# Patient Record
Sex: MALE | Race: WHITE
[De-identification: names, ages, dates, MRNs, and addresses within clinical notes are randomized per-mention and may not be internally consistent; named-entity substitution may affect disease eponyms.]

---

## 2017-12-07 ENCOUNTER — HOSPITAL ENCOUNTER (OUTPATIENT)
Dept: HOSPITAL 8 - CFH | Age: 42
Discharge: HOME | End: 2017-12-07
Attending: FAMILY MEDICINE
Payer: MEDICAID

## 2017-12-07 DIAGNOSIS — M50.323: Primary | ICD-10-CM

## 2017-12-07 DIAGNOSIS — M50.223: ICD-10-CM

## 2017-12-07 DIAGNOSIS — M62.81: ICD-10-CM

## 2017-12-07 PROCEDURE — 72141 MRI NECK SPINE W/O DYE: CPT

## 2018-04-02 ENCOUNTER — HOSPITAL ENCOUNTER (OUTPATIENT)
Dept: HOSPITAL 8 - CFH | Age: 43
Discharge: HOME | End: 2018-04-02
Attending: FAMILY MEDICINE
Payer: MEDICAID

## 2018-04-02 DIAGNOSIS — M62.81: Primary | ICD-10-CM

## 2018-04-02 PROCEDURE — A9585 GADOBUTROL INJECTION: HCPCS

## 2018-04-02 PROCEDURE — 70553 MRI BRAIN STEM W/O & W/DYE: CPT

## 2020-07-08 ENCOUNTER — HOSPITAL ENCOUNTER (EMERGENCY)
Dept: HOSPITAL 8 - ED | Age: 45
Discharge: HOME | End: 2020-07-08
Payer: SELF-PAY

## 2020-07-08 VITALS — DIASTOLIC BLOOD PRESSURE: 87 MMHG | SYSTOLIC BLOOD PRESSURE: 142 MMHG

## 2020-07-08 VITALS — HEIGHT: 73 IN | BODY MASS INDEX: 36.55 KG/M2 | WEIGHT: 275.8 LBS

## 2020-07-08 DIAGNOSIS — L03.115: Primary | ICD-10-CM

## 2020-07-08 DIAGNOSIS — Z87.891: ICD-10-CM

## 2020-07-08 LAB
ALBUMIN SERPL-MCNC: 3.7 G/DL (ref 3.4–5)
ANION GAP SERPL CALC-SCNC: 5 MMOL/L (ref 5–15)
BASOPHILS # BLD AUTO: 0.02 X10^3/UL (ref 0–0.1)
BASOPHILS NFR BLD AUTO: 0 % (ref 0–1)
CALCIUM SERPL-MCNC: 9.2 MG/DL (ref 8.5–10.1)
CHLORIDE SERPL-SCNC: 107 MMOL/L (ref 98–107)
CREAT SERPL-MCNC: 1 MG/DL (ref 0.7–1.3)
EOSINOPHIL # BLD AUTO: 0.06 X10^3/UL (ref 0–0.4)
EOSINOPHIL NFR BLD AUTO: 1 % (ref 1–7)
ERYTHROCYTE [DISTWIDTH] IN BLOOD BY AUTOMATED COUNT: 13.3 % (ref 9.4–14.8)
LYMPHOCYTES # BLD AUTO: 1.3 X10^3/UL (ref 1–3.4)
LYMPHOCYTES NFR BLD AUTO: 18 % (ref 22–44)
MCH RBC QN AUTO: 30.4 PG (ref 27.5–34.5)
MCHC RBC AUTO-ENTMCNC: 33 G/DL (ref 33.2–36.2)
MCV RBC AUTO: 92.1 FL (ref 81–97)
MD: NO
MONOCYTES # BLD AUTO: 0.7 X10^3/UL (ref 0.2–0.8)
MONOCYTES NFR BLD AUTO: 10 % (ref 2–9)
NEUTROPHILS # BLD AUTO: 5.11 X10^3/UL (ref 1.8–6.8)
NEUTROPHILS NFR BLD AUTO: 71 % (ref 42–75)
PLATELET # BLD AUTO: 195 X10^3/UL (ref 130–400)
PMV BLD AUTO: 8.8 FL (ref 7.4–10.4)
RBC # BLD AUTO: 5.1 X10^6/UL (ref 4.38–5.82)

## 2020-07-08 PROCEDURE — 82040 ASSAY OF SERUM ALBUMIN: CPT

## 2020-07-08 PROCEDURE — 80048 BASIC METABOLIC PNL TOTAL CA: CPT

## 2020-07-08 PROCEDURE — 85025 COMPLETE CBC W/AUTO DIFF WBC: CPT

## 2020-07-08 PROCEDURE — 99283 EMERGENCY DEPT VISIT LOW MDM: CPT

## 2020-07-08 PROCEDURE — 36415 COLL VENOUS BLD VENIPUNCTURE: CPT

## 2021-09-16 ENCOUNTER — APPOINTMENT (OUTPATIENT)
Dept: RADIOLOGY | Facility: MEDICAL CENTER | Age: 46
End: 2021-09-16
Attending: EMERGENCY MEDICINE
Payer: COMMERCIAL

## 2021-09-16 ENCOUNTER — HOSPITAL ENCOUNTER (EMERGENCY)
Facility: MEDICAL CENTER | Age: 46
End: 2021-09-16
Attending: EMERGENCY MEDICINE
Payer: COMMERCIAL

## 2021-09-16 VITALS
HEART RATE: 101 BPM | DIASTOLIC BLOOD PRESSURE: 58 MMHG | HEIGHT: 73 IN | RESPIRATION RATE: 16 BRPM | TEMPERATURE: 98.8 F | BODY MASS INDEX: 37.08 KG/M2 | WEIGHT: 279.76 LBS | OXYGEN SATURATION: 97 % | SYSTOLIC BLOOD PRESSURE: 109 MMHG

## 2021-09-16 DIAGNOSIS — L03.115 CELLULITIS OF RIGHT LOWER EXTREMITY: ICD-10-CM

## 2021-09-16 DIAGNOSIS — R21 RASH: ICD-10-CM

## 2021-09-16 LAB
ANION GAP SERPL CALC-SCNC: 11 MMOL/L (ref 7–16)
BASOPHILS # BLD AUTO: 0.3 % (ref 0–1.8)
BASOPHILS # BLD: 0.03 K/UL (ref 0–0.12)
BUN SERPL-MCNC: 19 MG/DL (ref 8–22)
CALCIUM SERPL-MCNC: 8.9 MG/DL (ref 8.5–10.5)
CHLORIDE SERPL-SCNC: 98 MMOL/L (ref 96–112)
CO2 SERPL-SCNC: 28 MMOL/L (ref 20–33)
CREAT SERPL-MCNC: 0.96 MG/DL (ref 0.5–1.4)
EOSINOPHIL # BLD AUTO: 0.1 K/UL (ref 0–0.51)
EOSINOPHIL NFR BLD: 0.9 % (ref 0–6.9)
ERYTHROCYTE [DISTWIDTH] IN BLOOD BY AUTOMATED COUNT: 45.3 FL (ref 35.9–50)
GLUCOSE SERPL-MCNC: 88 MG/DL (ref 65–99)
HCT VFR BLD AUTO: 43.9 % (ref 42–52)
HGB BLD-MCNC: 14.9 G/DL (ref 14–18)
IMM GRANULOCYTES # BLD AUTO: 0.06 K/UL (ref 0–0.11)
IMM GRANULOCYTES NFR BLD AUTO: 0.5 % (ref 0–0.9)
LYMPHOCYTES # BLD AUTO: 1.6 K/UL (ref 1–4.8)
LYMPHOCYTES NFR BLD: 14.2 % (ref 22–41)
MCH RBC QN AUTO: 31.2 PG (ref 27–33)
MCHC RBC AUTO-ENTMCNC: 33.9 G/DL (ref 33.7–35.3)
MCV RBC AUTO: 92 FL (ref 81.4–97.8)
MONOCYTES # BLD AUTO: 1.01 K/UL (ref 0–0.85)
MONOCYTES NFR BLD AUTO: 9 % (ref 0–13.4)
NEUTROPHILS # BLD AUTO: 8.46 K/UL (ref 1.82–7.42)
NEUTROPHILS NFR BLD: 75.1 % (ref 44–72)
NRBC # BLD AUTO: 0 K/UL
NRBC BLD-RTO: 0 /100 WBC
PLATELET # BLD AUTO: 207 K/UL (ref 164–446)
PMV BLD AUTO: 9.8 FL (ref 9–12.9)
POTASSIUM SERPL-SCNC: 4.6 MMOL/L (ref 3.6–5.5)
RBC # BLD AUTO: 4.77 M/UL (ref 4.7–6.1)
SODIUM SERPL-SCNC: 137 MMOL/L (ref 135–145)
WBC # BLD AUTO: 11.3 K/UL (ref 4.8–10.8)

## 2021-09-16 PROCEDURE — 85025 COMPLETE CBC W/AUTO DIFF WBC: CPT

## 2021-09-16 PROCEDURE — 93971 EXTREMITY STUDY: CPT | Mod: RT

## 2021-09-16 PROCEDURE — 700105 HCHG RX REV CODE 258: Performed by: EMERGENCY MEDICINE

## 2021-09-16 PROCEDURE — 96365 THER/PROPH/DIAG IV INF INIT: CPT

## 2021-09-16 PROCEDURE — 700111 HCHG RX REV CODE 636 W/ 250 OVERRIDE (IP): Performed by: EMERGENCY MEDICINE

## 2021-09-16 PROCEDURE — 80048 BASIC METABOLIC PNL TOTAL CA: CPT

## 2021-09-16 PROCEDURE — 99284 EMERGENCY DEPT VISIT MOD MDM: CPT

## 2021-09-16 RX ORDER — SULFAMETHOXAZOLE AND TRIMETHOPRIM 800; 160 MG/1; MG/1
1 TABLET ORAL 2 TIMES DAILY
Qty: 14 TABLET | Refills: 1 | Status: SHIPPED | OUTPATIENT
Start: 2021-09-16 | End: 2021-11-26

## 2021-09-16 RX ORDER — CEFAZOLIN SODIUM 2 G/100ML
2 INJECTION, SOLUTION INTRAVENOUS ONCE
Status: COMPLETED | OUTPATIENT
Start: 2021-09-16 | End: 2021-09-16

## 2021-09-16 RX ORDER — CEPHALEXIN 500 MG/1
500 TABLET ORAL 4 TIMES DAILY
Qty: 28 TABLET | Refills: 1 | Status: SHIPPED | OUTPATIENT
Start: 2021-09-16 | End: 2021-11-26

## 2021-09-16 RX ORDER — CEPHALEXIN 500 MG/1
500 TABLET ORAL 4 TIMES DAILY
Qty: 28 TABLET | Refills: 0 | Status: SHIPPED | OUTPATIENT
Start: 2021-09-16 | End: 2021-09-16 | Stop reason: SDUPTHER

## 2021-09-16 RX ORDER — SODIUM CHLORIDE 9 MG/ML
1000 INJECTION, SOLUTION INTRAVENOUS ONCE
Status: COMPLETED | OUTPATIENT
Start: 2021-09-16 | End: 2021-09-16

## 2021-09-16 RX ORDER — TRIAMCINOLONE ACETONIDE 1 MG/G
OINTMENT TOPICAL
Qty: 30 G | Refills: 0 | Status: SHIPPED | OUTPATIENT
Start: 2021-09-16 | End: 2021-11-26

## 2021-09-16 RX ADMIN — SODIUM CHLORIDE 1000 ML: 9 INJECTION, SOLUTION INTRAVENOUS at 18:29

## 2021-09-16 RX ADMIN — CEFAZOLIN SODIUM 2 G: 2 INJECTION, SOLUTION INTRAVENOUS at 18:29

## 2021-09-17 NOTE — ED NOTES
Patient discharged in stable condition per orders. IV access removed - bandage applied. Patient verbalized understanding of all discharge instructions. All belongings accounted for. Pt ambulatory to lobby with steady gait.

## 2021-09-17 NOTE — ED TRIAGE NOTES
"Chief Complaint   Patient presents with   • Rash     46 yo male ambulatory to triage for above complaint. Pt reports redness and swelling to R shin x 2 wks, hx of cellulitis to area. Also reports large red rash to abdomen x 3D with no response to anti-itch creams. AOx4, GCS 15.    Educated on triage process, encourage to inform staff of any changes.     /94   Pulse (!) 120   Temp 37.5 °C (99.5 °F) (Temporal)   Resp 16   Ht 1.854 m (6' 1\")   Wt (!) 127 kg (279 lb 12.2 oz)   SpO2 95%   BMI 36.91 kg/m²   "

## 2021-09-17 NOTE — DISCHARGE INSTRUCTIONS
Cellulitis    Start antibiotics cephalexin tomorrow morning.  Elevate the affected area above the heart if possible.  Return for redness spreading more than 1-2 inches, red streaks, ill appearance or formation of abscess.  Keep any open areas bandaged to prevent contagion.  See a doctor or return if not better 2-3 days.  Use triamcinolone cream on the skin of the chest for rash.  Start bactrim if not improving 3 days.  Take both for a second week if not completely resolved.    You had an elevated or high normal blood pressure reading today.  This does not necessarily mean you have hypertension.  Please followup with your/a primary physician for comprehensive blood pressure evaluation.  BP Readings from Last 3 Encounters:   09/16/21 160/94   .      Your caregiver has diagnosed you or your child as having cellulitis. Cellulitis is an infection of the skin and the tissue beneath it. The area is typically red and tender. It is caused by bacteria (germs) (usually staph or strep) that enter the body through cuts or sores. Cellulitis most commonly occurs in the arms and/or lower legs.      HOME CARE INSTRUCTIONS  If you are given a prescription for antibiotics (medications which kill germs), take as directed until finished.  If the infection is on the arm or leg, keep the limb elevated as able.  Use a heating pad several times per day to relieve pain and encourage healing. Do not sleep with heating pad. If you are diabetic, do not use a heating pad unless instructed to do so.  See your caregiver for a recheck of the infected site in 2 days, or sooner if problems arise.  Use acetaminophen (Tylenol®) or ibuprofen (Advil® or Motrin®) as needed for relief of fever, pain and discomfort.    seek medical care if:  An oral temperature above 102° F (38.9° C) develops, or as your caregiver suggests, not controlled by medication.  The area of redness is spreading, there are red streaks coming from the infected site, or if a part of  the infection begins to turn dark in color.  The joint or bone underneath the infected skin becomes painful after the skin has healed.  The infection returns in the same or another area after it seems to have gone away.  A boil or bump swells up. This may be an abscess.  New, unexplained symptoms (problems) such as pain or fever develop.    seek immediate medical care if:   You or your child feel drowsy or lethargic.  There is vomiting, diarrhea, or generalized malaise with muscle aches and pains.    Document Released: 09/27/2006  Document Re-Released: 01/29/2008  [facility] [facility]® Patient Information ©2008 [employer].

## 2021-09-17 NOTE — ED PROVIDER NOTES
ED Provider Note    Scribed for Mark Lawson M.D. by Zafar Gatica. 9/16/2021  5:41 PM    Primary care provider: None reported.   Means of arrival: Walk-in  History obtained from: Patient  History limited by: None     CHIEF COMPLAINT  Chief Complaint   Patient presents with   • Rash       HPI  Robby Fox is a 45 y.o. male who presents to the Emergency Department complaining of redness and swelling to the right leg onset 1.5-2 weeks ago.He states that when it first began it felt like he was walking on bone, and the pain has remained constant over the past two weeks. He has associated symptoms of pain in the right leg and a rash on his stomach that began three days ago. He states that the rash on his stomach itches when he touches it. He denies any fever, nausea, vomiting, body aches, flu like symptoms, chest pain, shortness of breath, coughing up blood, cough, headache, abdominal pain, changes in urination, or changes in bowel movement.  He had an infection in his right leg in September, 2020 and was prescribed bactrim and keflex. He denies any history of diabetes, deep vein thrombosis, or pulmonary embolism. He has a history of asthma and sleep apnea. He has not received a COVID-19 vaccine.     REVIEW OF SYSTEMS  Pertinent positives include: redness and swelling to the left leg, pain in the left leg, and a rash on his stomach.  Pertinent negatives include: fever, nausea, vomiting, body aches, flu like symptoms, chest pain, shortness of breath, coughing up blood, cough, headache, abdominal pain, changes in urination, or changes in bowel movement.  10+ systems reviewed and negative.      PAST MEDICAL HISTORY  Cellulitis, denies diabetes or immune compromise    SOCIAL HISTORY  Social History     Tobacco Use   • Smoking status: Never Smoker   • Smokeless tobacco: Never Used   Substance Use Topics   • Alcohol use: Yes     Comment: occ   • Drug use: Never     Social History     Substance and Sexual Activity  "  Drug Use Never       CURRENT MEDICATIONS  None.    ALLERGIES  None known    PHYSICAL EXAM  VITAL SIGNS: /94   Pulse (!) 120   Temp 37.5 °C (99.5 °F) (Temporal)   Resp 16   Ht 1.854 m (6' 1\")   Wt (!) 127 kg (279 lb 12.2 oz)   SpO2 95%   BMI 36.91 kg/m²   Reviewed and tachycardic, hypertensive, afebrile  Constitutional: Well developed, Well nourished, Elevated BMI.  HENT: Normocephalic, atraumatic, bilateral external ears normal, wearing a mask.   Eyes: PERRLA, conjunctiva pink, no scleral icterus.   Cardiovascular: Tachycardic. Regular rhythm. No murmurs, rubs or gallops.  No dependent edema or calf tenderness  Respiratory: Lungs clear to auscultation bilaterally. No wheezes, rales, or rhonchi.  Abdominal:  Abdomen soft, non-tender, non distended. No rebound, or guarding.    Skin: 2+ pitting edema, erythema, and scales without calf edema or tenderness. No lymphangitic streaking. Macular rash disconnected and symetric on torso.   Genitourinary: No costovertebral angle tenderness.   Musculoskeletal: no deformities.   Neurologic: Alert & oriented x 3, cranial nerves 2-12 intact by passive exam.  No focal deficit noted.  Psychiatric: Affect normal, Judgment normal, Mood normal.         DIFFERENTIAL DIAGNOSIS:  Cellulitis, DVT, or Allergic rash.      RADIOLOGY/PROCEDURES  US-EXTREMITY VENOUS LOWER UNILAT RIGHT   Final Result      NEG for DVT  Radiologist interpretation have been reviewed by me.     LABORATORY:  Results for orders placed or performed during the hospital encounter of 09/16/21   CBC WITH DIFFERENTIAL   Result Value Ref Range    WBC 11.3 (H) 4.8 - 10.8 K/uL    RBC 4.77 4.70 - 6.10 M/uL    Hemoglobin 14.9 14.0 - 18.0 g/dL    Hematocrit 43.9 42.0 - 52.0 %    MCV 92.0 81.4 - 97.8 fL    MCH 31.2 27.0 - 33.0 pg    MCHC 33.9 33.7 - 35.3 g/dL    RDW 45.3 35.9 - 50.0 fL    Platelet Count 207 164 - 446 K/uL    MPV 9.8 9.0 - 12.9 fL    Neutrophils-Polys 75.10 (H) 44.00 - 72.00 %    Lymphocytes 14.20 (L) " "22.00 - 41.00 %    Monocytes 9.00 0.00 - 13.40 %    Eosinophils 0.90 0.00 - 6.90 %    Basophils 0.30 0.00 - 1.80 %    Immature Granulocytes 0.50 0.00 - 0.90 %    Nucleated RBC 0.00 /100 WBC    Neutrophils (Absolute) 8.46 (H) 1.82 - 7.42 K/uL    Lymphs (Absolute) 1.60 1.00 - 4.80 K/uL    Monos (Absolute) 1.01 (H) 0.00 - 0.85 K/uL    Eos (Absolute) 0.10 0.00 - 0.51 K/uL    Baso (Absolute) 0.03 0.00 - 0.12 K/uL    Immature Granulocytes (abs) 0.06 0.00 - 0.11 K/uL    NRBC (Absolute) 0.00 K/uL   Basic Metabolic Panel   Result Value Ref Range    Sodium 137 135 - 145 mmol/L    Potassium 4.6 3.6 - 5.5 mmol/L    Chloride 98 96 - 112 mmol/L    Co2 28 20 - 33 mmol/L    Glucose 88 65 - 99 mg/dL    Bun 19 8 - 22 mg/dL    Creatinine 0.96 0.50 - 1.40 mg/dL    Calcium 8.9 8.5 - 10.5 mg/dL    Anion Gap 11.0 7.0 - 16.0       Lab results reviewed by me.     INTERVENTIONS:  Medications   ceFAZolin in dextrose (ANCEF) IVPB premix 2 g (2 g Intravenous New Bag 9/16/21 1829)   NS infusion 1,000 mL (1,000 mL Intravenous New Bag 9/16/21 1829)       ED COURSE:  Nursing notes, VS, PMSFHx reviewed in chart.     5:41 PM - Patient seen and examined at bedside. Patient will be treated with ANCEF IVPB premix 2 g and NS infusion 1,000 mL for his symptoms. Ordered US-Extremity Venous Lower Unilateral Right, CBC with differential, and BMP to evaluate.     MEDICAL DECISION MAKING:  Well-appearing patient presents with right leg redness and swelling and appears to have a cellulitis.  There is no DVT.  There is no evidence of abscess or necrotizing fasciitis.  He does not have sepsis.  He also has a rash on his chest and abdomen that is mostly symmetric and is likely allergic.    Vital signs improved below after IV fluids  /58   Pulse (!) 101   Temp 37.1 °C (98.8 °F) (Temporal)   Resp 16   Ht 1.854 m (6' 1\")   Wt (!) 127 kg (279 lb 12.2 oz)   SpO2 97%   BMI 36.91 kg/m²     PLAN:  Discharge Medication List as of 9/16/2021  8:02 PM      START " taking these medications    Details   triamcinolone acetonide (KENALOG) 0.1 % Ointment Apply small amount to rash TID, sparing face and genitals., Disp-30 g, R-0, Print Rx Paper      sulfamethoxazole-trimethoprim (BACTRIM DS) 800-160 MG tablet Take 1 Tablet by mouth 2 times a day., Disp-14 Tablet, R-1, Print Rx Paper         Ancef  Ibuprofen, Tylenol, leg elevation  Cellulitis handout given  Return for spreading more than 2 inches, persistent dizziness, uncontrolled vomiting, ill appearance    [facility], Emergency Dept  [address] [address] [phone]    If not improving in 2 to 3 days or for redness spreading more than 2 inches, uncontrolled vomiting, dizziness, ill appearance.      CONDITION: Stable.     FINAL IMPRESSION  1. Cellulitis of right lower extremity    2. Rash          IaZfar (Scribe), am scribing for, and in the presence of, Mark Lawson M.D..    Electronically signed by: Zafar Gatica (Scribe), 9/16/2021    IMark M.D. personally performed the services described in this documentation, as scribed by Zafar Gatica in my presence, and it is both accurate and complete. C.     The note accurately reflects work and decisions made by me.  Mark Lawson M.D.  9/17/2021  2:07 PM

## 2021-11-26 ENCOUNTER — HOSPITAL ENCOUNTER (EMERGENCY)
Facility: MEDICAL CENTER | Age: 46
End: 2021-11-26
Attending: EMERGENCY MEDICINE
Payer: COMMERCIAL

## 2021-11-26 VITALS
DIASTOLIC BLOOD PRESSURE: 74 MMHG | BODY MASS INDEX: 35.24 KG/M2 | TEMPERATURE: 97.5 F | HEIGHT: 73 IN | HEART RATE: 95 BPM | WEIGHT: 265.88 LBS | RESPIRATION RATE: 18 BRPM | SYSTOLIC BLOOD PRESSURE: 121 MMHG | OXYGEN SATURATION: 97 %

## 2021-11-26 DIAGNOSIS — L03.119 CELLULITIS OF LOWER EXTREMITY, UNSPECIFIED LATERALITY: ICD-10-CM

## 2021-11-26 LAB
ANION GAP SERPL CALC-SCNC: 15 MMOL/L (ref 7–16)
BASOPHILS # BLD AUTO: 0.4 % (ref 0–1.8)
BASOPHILS # BLD: 0.05 K/UL (ref 0–0.12)
BUN SERPL-MCNC: 12 MG/DL (ref 8–22)
CALCIUM SERPL-MCNC: 9.8 MG/DL (ref 8.4–10.2)
CHLORIDE SERPL-SCNC: 98 MMOL/L (ref 96–112)
CO2 SERPL-SCNC: 25 MMOL/L (ref 20–33)
CREAT SERPL-MCNC: 0.92 MG/DL (ref 0.5–1.4)
EOSINOPHIL # BLD AUTO: 0.04 K/UL (ref 0–0.51)
EOSINOPHIL NFR BLD: 0.4 % (ref 0–6.9)
ERYTHROCYTE [DISTWIDTH] IN BLOOD BY AUTOMATED COUNT: 41.8 FL (ref 35.9–50)
EST. AVERAGE GLUCOSE BLD GHB EST-MCNC: 114 MG/DL
GLUCOSE SERPL-MCNC: 106 MG/DL (ref 65–99)
GRAM STN SPEC: NORMAL
HBA1C MFR BLD: 5.6 % (ref 4–5.6)
HCT VFR BLD AUTO: 49.4 % (ref 42–52)
HGB BLD-MCNC: 16.6 G/DL (ref 14–18)
IMM GRANULOCYTES # BLD AUTO: 0.09 K/UL (ref 0–0.11)
IMM GRANULOCYTES NFR BLD AUTO: 0.8 % (ref 0–0.9)
LYMPHOCYTES # BLD AUTO: 1.75 K/UL (ref 1–4.8)
LYMPHOCYTES NFR BLD: 15.7 % (ref 22–41)
MCH RBC QN AUTO: 30.6 PG (ref 27–33)
MCHC RBC AUTO-ENTMCNC: 33.6 G/DL (ref 33.7–35.3)
MCV RBC AUTO: 91 FL (ref 81.4–97.8)
MONOCYTES # BLD AUTO: 0.8 K/UL (ref 0–0.85)
MONOCYTES NFR BLD AUTO: 7.2 % (ref 0–13.4)
NEUTROPHILS # BLD AUTO: 8.45 K/UL (ref 1.82–7.42)
NEUTROPHILS NFR BLD: 75.5 % (ref 44–72)
NRBC # BLD AUTO: 0 K/UL
NRBC BLD-RTO: 0 /100 WBC
PLATELET # BLD AUTO: 280 K/UL (ref 164–446)
PMV BLD AUTO: 9.8 FL (ref 9–12.9)
POTASSIUM SERPL-SCNC: 4.2 MMOL/L (ref 3.6–5.5)
RBC # BLD AUTO: 5.43 M/UL (ref 4.7–6.1)
SIGNIFICANT IND 70042: NORMAL
SITE SITE: NORMAL
SODIUM SERPL-SCNC: 138 MMOL/L (ref 135–145)
SOURCE SOURCE: NORMAL
WBC # BLD AUTO: 11.2 K/UL (ref 4.8–10.8)

## 2021-11-26 PROCEDURE — 87186 SC STD MICRODIL/AGAR DIL: CPT

## 2021-11-26 PROCEDURE — 85025 COMPLETE CBC W/AUTO DIFF WBC: CPT

## 2021-11-26 PROCEDURE — 83036 HEMOGLOBIN GLYCOSYLATED A1C: CPT

## 2021-11-26 PROCEDURE — 36415 COLL VENOUS BLD VENIPUNCTURE: CPT

## 2021-11-26 PROCEDURE — 80048 BASIC METABOLIC PNL TOTAL CA: CPT

## 2021-11-26 PROCEDURE — 87077 CULTURE AEROBIC IDENTIFY: CPT

## 2021-11-26 PROCEDURE — 87070 CULTURE OTHR SPECIMN AEROBIC: CPT

## 2021-11-26 PROCEDURE — A6403 STERILE GAUZE>16 <= 48 SQ IN: HCPCS

## 2021-11-26 PROCEDURE — 87205 SMEAR GRAM STAIN: CPT

## 2021-11-26 PROCEDURE — 99283 EMERGENCY DEPT VISIT LOW MDM: CPT

## 2021-11-26 RX ORDER — CHLORHEXIDINE GLUCONATE 213 G/1000ML
SOLUTION TOPICAL
Qty: 240 ML | Refills: 2 | Status: SHIPPED | OUTPATIENT
Start: 2021-11-26 | End: 2022-09-10

## 2021-11-26 RX ORDER — BUPROPION HYDROCHLORIDE 300 MG/1
300 TABLET ORAL EVERY MORNING
COMMUNITY

## 2021-11-26 RX ORDER — LORATADINE 10 MG/1
10 TABLET ORAL EVERY MORNING
COMMUNITY

## 2021-11-26 RX ORDER — SULFAMETHOXAZOLE AND TRIMETHOPRIM 800; 160 MG/1; MG/1
1 TABLET ORAL 2 TIMES DAILY
Qty: 24 TABLET | Refills: 0 | Status: SHIPPED | OUTPATIENT
Start: 2021-11-26 | End: 2021-12-08

## 2021-11-26 RX ORDER — NAPROXEN 500 MG/1
1000 TABLET ORAL 4 TIMES DAILY PRN
COMMUNITY

## 2021-11-26 RX ORDER — CEPHALEXIN 500 MG/1
500 CAPSULE ORAL 4 TIMES DAILY
Qty: 48 CAPSULE | Refills: 0 | Status: SHIPPED | OUTPATIENT
Start: 2021-11-26 | End: 2021-12-08

## 2021-11-26 RX ORDER — ASCORBIC ACID 500 MG
500 TABLET ORAL EVERY MORNING
Status: SHIPPED | COMMUNITY
End: 2022-09-10

## 2021-11-26 NOTE — ED NOTES
Med rec completed per Pt at bedside.  Allergies reviewed with Pt. No known drug allergies.  Pt reports taking naproxen 500 mg, 2 tablets up to 4 times per day (4,000 mg per 24 hours) as needed for pain. Pharmacist notified.  No oral antibiotics in last 30 days.  Pt's preferred pharmacy: [facility] on [address]

## 2021-11-26 NOTE — ED NOTES
Iv placed , labs bld cx x 1  drawn and taken to lab. poc update given to pt, results pending at this time  Wound cx from left ankle done

## 2021-11-26 NOTE — ED TRIAGE NOTES
Pt presents with sister from home for recurrent skin infection on right lower leg. This one started last Saturday. +chills. A&Ox4 and ambulatory.     Has this patient been vaccinated for COVID no  If not, would they like to be vaccinated while in the ER if eligible?  no  Would the patient like to speak with the ERP about the possibility of receiving the COVID vaccine today before making a decision? no

## 2021-11-26 NOTE — ED PROVIDER NOTES
ED Provider Note    CHIEF COMPLAINT  Leg infection    HPI  Robby Fox is a 46 y.o. male who presents to contact him here with leg infection.  He describes it as sores that are draining.  Right leg is just near the knee left leg which is healing and also is associated with fevers and chills on Saturday.  No alleviating factors.  No exacerbating factors and associated with infection on his toes which started in his left toes and then his right    Very pleasant 46-year-old male he has no underlying medical conditions such as diabetes or immune issues he does have a shared common area where he has a room but he has shared common areas including bathroom with other people.  And is here now with infection    It started with his left foot about 2 weeks ago on his toes became crusty with yellowish discharge.  Then, he noticed on his right foot and also both of his legs.  He believes it is most likely spread while he was sleeping.  I will review his chart he been treated for cellulitis in September.  And also last year.    REVIEW OF SYSTEMS  General: No fever chills  Dermatologic: See above      PAST MEDICAL HISTORY  Past Medical History:   Diagnosis Date   • Asthma        FAMILY HISTORY  History reviewed. No pertinent family history.    SOCIAL HISTORY  Social History     Socioeconomic History   • Marital status: Single     Spouse name: Not on file   • Number of children: Not on file   • Years of education: Not on file   • Highest education level: Not on file   Occupational History   • Not on file   Tobacco Use   • Smoking status: Former Smoker   • Smokeless tobacco: Never Used   Vaping Use   • Vaping Use: Never used   Substance and Sexual Activity   • Alcohol use: Yes     Comment: occ   • Drug use: Never   • Sexual activity: Not on file   Other Topics Concern   • Not on file   Social History Narrative   • Not on file     Social Determinants of Health     Financial Resource Strain:    • Difficulty of Paying Living  "Expenses: Not on file   Food Insecurity:    • Worried About Running Out of Food in the Last Year: Not on file   • Ran Out of Food in the Last Year: Not on file   Transportation Needs:    • Lack of Transportation (Medical): Not on file   • Lack of Transportation (Non-Medical): Not on file   Physical Activity:    • Days of Exercise per Week: Not on file   • Minutes of Exercise per Session: Not on file   Stress:    • Feeling of Stress : Not on file   Social Connections:    • Frequency of Communication with Friends and Family: Not on file   • Frequency of Social Gatherings with Friends and Family: Not on file   • Attends Restoration Services: Not on file   • Active Member of Clubs or Organizations: Not on file   • Attends Club or Organization Meetings: Not on file   • Marital Status: Not on file   Intimate Partner Violence:    • Fear of Current or Ex-Partner: Not on file   • Emotionally Abused: Not on file   • Physically Abused: Not on file   • Sexually Abused: Not on file   Housing Stability:    • Unable to Pay for Housing in the Last Year: Not on file   • Number of Places Lived in the Last Year: Not on file   • Unstable Housing in the Last Year: Not on file       SURGICAL HISTORY  Past Surgical History:   Procedure Laterality Date   • INGUINAL HERNIA REPAIR     • OTHER ORTHOPEDIC SURGERY      cyst removal around neck       CURRENT MEDICATIONS  Home Medications     Reviewed by Pushpa Trejo R.N. (Registered Nurse) on 11/26/21 at 1106  Med List Status: Not Addressed   Medication Last Dose Status   Cephalexin 500 MG Tab  Active   sulfamethoxazole-trimethoprim (BACTRIM DS) 800-160 MG tablet  Active   triamcinolone acetonide (KENALOG) 0.1 % Ointment  Active                 ALLERGIES  No Known Allergies    PHYSICAL EXAM  VITAL SIGNS: /84   Pulse (!) 136   Temp 36.9 °C (98.5 °F) (Temporal)   Resp 18   Ht 1.854 m (6' 1\")   Wt 121 kg (265 lb 14 oz)   SpO2 96%   BMI 35.08 kg/m²   Constitutional: Well developed, " Well nourished, No acute distress, Non-toxic appearance.   HENT: Normocephalic, Atraumatic, Bilateral external ears normal, Oropharynx moist, No oral exudates, Nose normal.   Eyes: PERRLA, EOMI, Conjunctiva normal, No discharge.   Musculoskeletal: Neck has normal range of motion, No tenderness, Supple.   Lymphatic: No cervical lymphadenopathy noted.   Cardiovascular: Normal heart rate, Normal rhythm, No murmurs, No rubs, No gallops.   Thorax & Lungs: Normal breath sounds, No respiratory distress, No wheezing, No chest tenderness.   Abdomen: Nondistended nontender soft  Skin: In his right medial portion between near his knee he has a quarter sized ulcerated area it is not significant deep it is red no surrounding erythema the left has 1 near his thigh which appears to be well-healing and scab 1 on his lateral side of his knee also slightly warm he has diffuse erythema both legs only still slightly ecchymotic over his right fourth third second and first toe is crusty yellowish discharge.  The toes are stuck together.  There is no streaking noted.  The right toes have some slight ulcerations noted.  : No CVA tenderness.   Psychiatric: Calm, not anxious  Neurologic: Alert & oriented, moves all extremities equally    RADIOLOGY/PROCEDURES  No orders to display     Results for orders placed or performed during the hospital encounter of 11/26/21   CBC WITH DIFFERENTIAL   Result Value Ref Range    WBC 11.2 (H) 4.8 - 10.8 K/uL    RBC 5.43 4.70 - 6.10 M/uL    Hemoglobin 16.6 14.0 - 18.0 g/dL    Hematocrit 49.4 42.0 - 52.0 %    MCV 91.0 81.4 - 97.8 fL    MCH 30.6 27.0 - 33.0 pg    MCHC 33.6 (L) 33.7 - 35.3 g/dL    RDW 41.8 35.9 - 50.0 fL    Platelet Count 280 164 - 446 K/uL    MPV 9.8 9.0 - 12.9 fL    Neutrophils-Polys 75.50 (H) 44.00 - 72.00 %    Lymphocytes 15.70 (L) 22.00 - 41.00 %    Monocytes 7.20 0.00 - 13.40 %    Eosinophils 0.40 0.00 - 6.90 %    Basophils 0.40 0.00 - 1.80 %    Immature Granulocytes 0.80 0.00 - 0.90 %     Nucleated RBC 0.00 /100 WBC    Neutrophils (Absolute) 8.45 (H) 1.82 - 7.42 K/uL    Lymphs (Absolute) 1.75 1.00 - 4.80 K/uL    Monos (Absolute) 0.80 0.00 - 0.85 K/uL    Eos (Absolute) 0.04 0.00 - 0.51 K/uL    Baso (Absolute) 0.05 0.00 - 0.12 K/uL    Immature Granulocytes (abs) 0.09 0.00 - 0.11 K/uL    NRBC (Absolute) 0.00 K/uL   BASIC METABOLIC PANEL   Result Value Ref Range    Sodium 138 135 - 145 mmol/L    Potassium 4.2 3.6 - 5.5 mmol/L    Chloride 98 96 - 112 mmol/L    Co2 25 20 - 33 mmol/L    Glucose 106 (H) 65 - 99 mg/dL    Bun 12 8 - 22 mg/dL    Creatinine 0.92 0.50 - 1.40 mg/dL    Calcium 9.8 8.4 - 10.2 mg/dL    Anion Gap 15.0 7.0 - 16.0   ESTIMATED GFR   Result Value Ref Range    GFR If African American >60 >60 mL/min/1.73 m 2    GFR If Non African American >60 >60 mL/min/1.73 m 2        COURSE & MEDICAL DECISION MAKING  Pertinent Labs & Imaging studies reviewed. (See chart for details)  46-year-old gentleman with cellulitis bilateral legs.  Cellulitis  Cause unknown we will check a 1 acc patient of diabetes he does have decreased hair over his legs is just most likely vascular he is living in a van for 5 years where he had cold temperatures stress or some chronic circulatory issues.  Does not appear septic we will give antibiotics just a CBC check a 1 AC.  See Ancef 2 g IV.  He has no significant pain while he is lying there only is putting his weight on his feet.    Chronic cellulitis  Patient years of chronic cellulitis with a new wound culture to see if it is MRSA he does live in a group home setting so he may need some chlorhexidine washes as well and better dermatology care.    He does not have a primary care doctor has been tween doctors.  Recommend that he follow-up and establish primary care doctor with [facility].  At this point the cultures and the A1c will not be present for anyone to follow-up    Extensive discussion and counseling regarding wound care proper barriers to treatment for skin why the  chlorhexidine and and Bactroban to rule out and treat for possible MRSA.    FINAL IMPRESSION  1.  Cellulitis with recurrent episodes  2.   3.      Electronically signed by: Esa Santamaria M.D., 11/26/2021 1:04 PM

## 2021-11-27 NOTE — ED NOTES
D/c pt home, 3 rx given directly to pharmacy  . Pt aware of f/u instructions , aware to return for any changes or concerns. No further questions upon d/c home from ed

## 2021-11-27 NOTE — DISCHARGE INSTRUCTIONS
Make sure you soak each foot for 20 minutes half peroxide half water  Make sure afterwards you dry your foot very well.  Put something between your toes to dry it out every day while you do this.  Do this for the next 10 days  Make sure you take antibiotics for the 12 days    At the end of December I want to do the complete body wash and 5 nights of the cream  Repeat this in the end of January as well    You can always use the ointment for any wound that starts right away.

## 2021-11-27 NOTE — ED NOTES
Adaptic dressing to wounds, MD spoke in length in regards to wound care .  Family at bedside, verbal understanding  Pt to be d/c home

## 2021-11-29 LAB
BACTERIA WND AEROBE CULT: ABNORMAL
GRAM STN SPEC: ABNORMAL
SIGNIFICANT IND 70042: ABNORMAL
SITE SITE: ABNORMAL
SOURCE SOURCE: ABNORMAL

## 2021-11-29 NOTE — ED NOTES
"ED Positive Culture Follow-up/Notification Note:    Date: 11/29/2021     Patient seen in the ED on 11/26/2021 for suspected infection of the leg.   1. Cellulitis of lower extremity, unspecified laterality       Discharge Medication List as of 11/26/2021  4:19 PM      START taking these medications    Details   cephALEXin (KEFLEX) 500 MG Cap Take 1 Capsule by mouth 4 times a day for 12 days., Disp-48 Capsule, R-0, Normal      sulfamethoxazole-trimethoprim (BACTRIM DS) 800-160 MG tablet Take 1 Tablet by mouth 2 times a day for 12 days., Disp-24 Tablet, R-0, Normal      mupirocin (BACTROBAN) 2 % Ointment Apply 1 Application topically every day. Apply nightly to inside of both noses for 5 nights.  Repeat in 1 month., Disp-10 g, R-0, Normal      chlorhexidine (HIBICLENS) 4 % liquid Is from head to feet once in late December and once in late January, Disp-240 mL, R-2, Normal             Allergies: Patient has no known allergies.     Vitals:    11/26/21 1059 11/26/21 1618   BP: 119/84 121/74   Pulse: (!) 136 95   Resp: 18 18   Temp: 36.9 °C (98.5 °F) 36.4 °C (97.5 °F)   TempSrc: Temporal    SpO2: 96% 97%   Weight: 121 kg (265 lb 14 oz)    Height: 1.854 m (6' 1\")        Final cultures:   Results     Procedure Component Value Units Date/Time    CULTURE WOUND W/ GRAM STAIN [[id number]]  (Abnormal)  (Susceptibility) Collected: 11/26/21 1318    Order Status: Completed Specimen: Wound from Left Foot Updated: 11/29/21 0816     Significant Indicator POS     Source WND     Site LEFT FOOT     Culture Result -     Gram Stain Result Many Gram positive cocci.  Rare Gram negative rods.       Culture Result Streptococcus pyogenes (Group A)  Heavy growth        Methicillin Resistant Staphylococcus aureus  Heavy growth        Morganella morganii  Moderate growth      Narrative:      CALL  [place]  [facility] tel. [phone],  CALLED  [facility] tel. [phone] 11/27/2021, 13:24, RB PERF. RESULTS CALLED TO:  [id number]    Susceptibility     Methicillin " resistant staphylococcus aureus (2)     Antibiotic Interpretation Microscan   Method Status    Ampicillin  [*]  Resistant >8 mcg/mL FLASH Final    Amoxicillin/CA  [*]  Resistant <=4/2 mcg/mL FLASH Final    Azithromycin Resistant >4 mcg/mL FLASH Final    Ceftriaxone  [*]  Resistant 16 mcg/mL FLASH Final    Clindamycin Sensitive <=0.25 mcg/mL FLASH Final    Cephalothin  [*]  Resistant <=8 mcg/mL FLASH Final    Cefoxitin Screen  [*]  Positive >4 mcg/mL FLASH Final    Cefazolin Resistant <=8 mcg/mL FLASH Final    Ciprofloxacin  [*]  Resistant >2 mcg/mL FLASH Final    Cefepime Resistant 16 mcg/mL FLASH Final    Ceftaroline Sensitive <=0.5 mcg/mL FLASH Final    Daptomycin Sensitive 1 mcg/mL FLASH Final    Erythromycin Resistant >4 mcg/mL FLASH Final    Gentamicin  [*]  Sensitive <=4 mcg/mL FLASH Final    Inducible Clindamycin Test  [*]  Negative <=4/0.5 mcg/mL FLASH Final    Imipenem  [*]  Resistant <=4 mcg/mL FLASH Final    Levofloxacin  [*]  Resistant >4 mcg/mL FLASH Final    Linezolid  [*]  Sensitive 2 mcg/mL FLASH Final    Ampicillin/sulbactam Resistant 16/8 mcg/mL FLASH Final    Meropenem  [*]  Resistant <=2 mcg/mL FLASH Final    Oxacillin Resistant >2 mcg/mL FLASH Final    Rifampin  [*]  Sensitive <=1 mcg/mL FLASH Final    Synercid  [*]  Sensitive <=0.5 mcg/mL FLASH Final    Trimeth/Sulfa Sensitive <=0.5/9.5 mcg/mL FLASH Final    Tetracycline Sensitive <=4 mcg/mL FLASH Final    Tigecycline  [*]  Sensitive <=0.25 mcg/mL FLASH Final    Vancomycin Sensitive 1 mcg/mL FLASH Final          Morganella morganii (3)     Antibiotic Interpretation Microscan   Method Status    Ampicillin Resistant >16 mcg/mL FLASH Final    Amoxicillin/CA  [*]  Resistant >16/8 mcg/mL FLASH Final    Ceftriaxone Sensitive <=1 mcg/mL FLASH Final    Cefazolin Resistant >16 mcg/mL FLASH Final    Ciprofloxacin Resistant 1 mcg/mL FLASH Final    Cefepime Sensitive 4 mcg/mL FLASH Final    Gentamicin Resistant >8 mcg/mL FLASH Final    Imipenem  [*]  Resistant 4 mcg/mL FLASH Final    Levofloxacin  [*]  Intermediate 1 mcg/mL  FLASH Final    Ampicillin/sulbactam Resistant >16/8 mcg/mL FLASH Final    Meropenem  [*]  Sensitive <=1 mcg/mL FLASH Final    Tobramycin Intermediate 8 mcg/mL FLASH Final    Trimeth/Sulfa Resistant >2/38 mcg/mL FLASH Final    Tetracycline  [*]  Resistant >8 mcg/mL FLASH Final    Tigecycline Resistant <=2 mcg/mL FLASH Final    Amikacin  [*]  Sensitive <=16 mcg/mL FLASH Final    Aztreonam  [*]  Resistant >16 mcg/mL FLASH Final    Ceftolozane+Tazobactam  [*]  Resistant >8 mcg/mL FLASH Final    Ceftazidime  [*]  Resistant >16 mcg/mL FLASH Final    Cefuroxime Resistant >16 mcg/mL FLASH Final    Ertapenem Sensitive <=0.5 mcg/mL FLASH Final    Nitrofurantoin  [*]  Resistant >64 mcg/mL FLASH Final    Meropenem/Vaborbactam  [*]  Sensitive <=2 mcg/mL FLASH Final    Minocycline Resistant >8 mcg/mL FLASH Final    Moxifloxacin Resistant >4 mcg/mL FLASH Final    Pip/Tazobactam Sensitive <=8 mcg/mL FLASH Final           [*]  Suppressed Antibiotic          Condensed View                   GRAM STAIN [[id number]] Collected: 11/26/21 1318    Order Status: Completed Specimen: Wound Updated: 11/26/21 1929     Significant Indicator .     Source WND     Site LEFT FOOT     Gram Stain Result Many Gram positive cocci.  Rare Gram negative rods.            Plan:   Morganella in wound culture may be AmpC  given resistance to aztreonam and ceftazidime. Unfortunately no oral options available for treatment (resistant to both Bactrim and fluoroquinolones). Attempted to call patient to recommend he come back in for IV antibiotic therapy, but both numbers did not work - home phone was a wrong number, and cell phone was found lost on ground by someone who does not know the patient. Sent letter informing of results and that patient may need IV antibiotic therapy for treatment.    If patient returns to ED, would recommend empiric treatment with cefepime 2 g IV q8H and vancomycin IV, along with ID consult for antibiotic management.    Celia Hogue, PharmD  PGY2 Infectious  Diseases Pharmacy Resident

## 2022-08-04 ENCOUNTER — OFFICE VISIT (OUTPATIENT)
Dept: URGENT CARE | Facility: CLINIC | Age: 47
End: 2022-08-04
Payer: COMMERCIAL

## 2022-08-04 VITALS
HEART RATE: 121 BPM | OXYGEN SATURATION: 97 % | SYSTOLIC BLOOD PRESSURE: 116 MMHG | WEIGHT: 285 LBS | HEIGHT: 73 IN | RESPIRATION RATE: 16 BRPM | TEMPERATURE: 96.6 F | DIASTOLIC BLOOD PRESSURE: 70 MMHG | BODY MASS INDEX: 37.77 KG/M2

## 2022-08-04 DIAGNOSIS — Z86.14 HISTORY OF MRSA INFECTION: ICD-10-CM

## 2022-08-04 DIAGNOSIS — L03.115 CELLULITIS OF RIGHT LOWER EXTREMITY: ICD-10-CM

## 2022-08-04 PROCEDURE — 99214 OFFICE O/P EST MOD 30 MIN: CPT | Performed by: FAMILY MEDICINE

## 2022-08-04 RX ORDER — CYCLOBENZAPRINE HCL 10 MG
TABLET ORAL
COMMUNITY
Start: 2022-07-15

## 2022-08-04 RX ORDER — AMOXICILLIN 500 MG/1
500 CAPSULE ORAL 3 TIMES DAILY
Qty: 30 CAPSULE | Refills: 0 | Status: SHIPPED | OUTPATIENT
Start: 2022-08-04 | End: 2022-08-14

## 2022-08-04 RX ORDER — SULFAMETHOXAZOLE AND TRIMETHOPRIM 800; 160 MG/1; MG/1
1 TABLET ORAL EVERY 12 HOURS
Qty: 20 TABLET | Refills: 0 | Status: SHIPPED | OUTPATIENT
Start: 2022-08-04 | End: 2022-08-14

## 2022-08-04 ASSESSMENT — ENCOUNTER SYMPTOMS
NAUSEA: 0
MYALGIAS: 0
EYE REDNESS: 0
EYE DISCHARGE: 0
WEIGHT LOSS: 0
VOMITING: 0

## 2022-08-05 NOTE — PROGRESS NOTES
"Subjective     Robby Fox is a 46 y.o. male who presents with Rash (Pt has a rash on (R) leg, hot to the touch x off and on 3 days )            This is a recurrent problem.  Right leg skin redness and warmth.  Subjective fever possibly preceded the redness.  No drainage.  He does have PMH MRSA.  No function or sensory loss.  No calf swelling or tenderness.  No chest pain or shortness of breath.  No other aggravating alleviating factors.      Review of Systems   Constitutional: Negative for malaise/fatigue and weight loss.   Eyes: Negative for discharge and redness.   Gastrointestinal: Negative for nausea and vomiting.   Musculoskeletal: Negative for joint pain and myalgias.   Skin: Negative for itching and rash.              Objective     /70 (BP Location: Left arm, Patient Position: Sitting, BP Cuff Size: Large adult)   Pulse (!) 121   Temp 35.9 °C (96.6 °F) (Temporal)   Resp 16   Ht 1.854 m (6' 1\")   Wt (!) 129 kg (285 lb)   SpO2 97%   BMI 37.60 kg/m²      Physical Exam  Constitutional:       General: He is not in acute distress.     Appearance: He is well-developed.   HENT:      Head: Normocephalic and atraumatic.   Eyes:      Conjunctiva/sclera: Conjunctivae normal.   Cardiovascular:      Rate and Rhythm: Normal rate and regular rhythm.      Heart sounds: Normal heart sounds. No murmur heard.  Pulmonary:      Effort: Pulmonary effort is normal.      Breath sounds: Normal breath sounds. No wheezing.   Musculoskeletal:        Legs:    Skin:     General: Skin is warm and dry.      Findings: No rash.   Neurological:      Mental Status: He is alert.                             Assessment & Plan      Wound culture 11/26/2021 reviewed    1. Cellulitis of right lower extremity    - sulfamethoxazole-trimethoprim (BACTRIM DS) 800-160 MG tablet; Take 1 Tablet by mouth every 12 hours for 10 days.  Dispense: 20 Tablet; Refill: 0  - amoxicillin (AMOXIL) 500 MG Cap; Take 1 Capsule by mouth 3 times a day for " 10 days.  Dispense: 30 Capsule; Refill: 0  - Referral to establish with [facility] PCP    2. History of MRSA infection    - sulfamethoxazole-trimethoprim (BACTRIM DS) 800-160 MG tablet; Take 1 Tablet by mouth every 12 hours for 10 days.  Dispense: 20 Tablet; Refill: 0  - amoxicillin (AMOXIL) 500 MG Cap; Take 1 Capsule by mouth 3 times a day for 10 days.  Dispense: 30 Capsule; Refill: 0  - Referral to establish with [facility] PCP    Differential diagnosis, natural history, supportive care, and indications for immediate follow-up discussed at length.

## 2022-09-10 ENCOUNTER — APPOINTMENT (OUTPATIENT)
Dept: RADIOLOGY | Facility: MEDICAL CENTER | Age: 47
DRG: 603 | End: 2022-09-10
Attending: EMERGENCY MEDICINE
Payer: COMMERCIAL

## 2022-09-10 ENCOUNTER — HOSPITAL ENCOUNTER (INPATIENT)
Facility: MEDICAL CENTER | Age: 47
LOS: 3 days | DRG: 603 | End: 2022-09-13
Attending: EMERGENCY MEDICINE | Admitting: HOSPITALIST
Payer: COMMERCIAL

## 2022-09-10 DIAGNOSIS — L03.115 CELLULITIS OF RIGHT LOWER EXTREMITY: ICD-10-CM

## 2022-09-10 PROBLEM — J45.20 MILD INTERMITTENT ASTHMA WITHOUT COMPLICATION: Status: ACTIVE | Noted: 2022-09-10

## 2022-09-10 PROBLEM — E66.09 CLASS 2 OBESITY DUE TO EXCESS CALORIES WITHOUT SERIOUS COMORBIDITY IN ADULT: Status: ACTIVE | Noted: 2022-09-10

## 2022-09-10 LAB
ALBUMIN SERPL BCP-MCNC: 4.1 G/DL (ref 3.2–4.9)
ALBUMIN/GLOB SERPL: 1.3 G/DL
ALP SERPL-CCNC: 60 U/L (ref 30–99)
ALT SERPL-CCNC: 35 U/L (ref 2–50)
ANION GAP SERPL CALC-SCNC: 9 MMOL/L (ref 7–16)
AST SERPL-CCNC: 26 U/L (ref 12–45)
BASOPHILS # BLD AUTO: 0.4 % (ref 0–1.8)
BASOPHILS # BLD: 0.03 K/UL (ref 0–0.12)
BILIRUB SERPL-MCNC: 0.5 MG/DL (ref 0.1–1.5)
BUN SERPL-MCNC: 14 MG/DL (ref 8–22)
CALCIUM SERPL-MCNC: 9.1 MG/DL (ref 8.5–10.5)
CHLORIDE SERPL-SCNC: 101 MMOL/L (ref 96–112)
CO2 SERPL-SCNC: 26 MMOL/L (ref 20–33)
CREAT SERPL-MCNC: 0.92 MG/DL (ref 0.5–1.4)
EOSINOPHIL # BLD AUTO: 0.11 K/UL (ref 0–0.51)
EOSINOPHIL NFR BLD: 1.4 % (ref 0–6.9)
ERYTHROCYTE [DISTWIDTH] IN BLOOD BY AUTOMATED COUNT: 45.2 FL (ref 35.9–50)
EST. AVERAGE GLUCOSE BLD GHB EST-MCNC: 117 MG/DL
GFR SERPLBLD CREATININE-BSD FMLA CKD-EPI: 103 ML/MIN/1.73 M 2
GLOBULIN SER CALC-MCNC: 3.2 G/DL (ref 1.9–3.5)
GLUCOSE SERPL-MCNC: 130 MG/DL (ref 65–99)
HBA1C MFR BLD: 5.7 % (ref 4–5.6)
HCT VFR BLD AUTO: 46.2 % (ref 42–52)
HGB BLD-MCNC: 15.8 G/DL (ref 14–18)
HIV 1+2 AB+HIV1 P24 AG SERPL QL IA: NORMAL
IMM GRANULOCYTES # BLD AUTO: 0.05 K/UL (ref 0–0.11)
IMM GRANULOCYTES NFR BLD AUTO: 0.6 % (ref 0–0.9)
LACTATE SERPL-SCNC: 1.5 MMOL/L (ref 0.5–2)
LACTATE SERPL-SCNC: 2.9 MMOL/L (ref 0.5–2)
LYMPHOCYTES # BLD AUTO: 1.15 K/UL (ref 1–4.8)
LYMPHOCYTES NFR BLD: 14.9 % (ref 22–41)
MCH RBC QN AUTO: 31 PG (ref 27–33)
MCHC RBC AUTO-ENTMCNC: 34.2 G/DL (ref 33.7–35.3)
MCV RBC AUTO: 90.8 FL (ref 81.4–97.8)
MONOCYTES # BLD AUTO: 0.73 K/UL (ref 0–0.85)
MONOCYTES NFR BLD AUTO: 9.5 % (ref 0–13.4)
NEUTROPHILS # BLD AUTO: 5.64 K/UL (ref 1.82–7.42)
NEUTROPHILS NFR BLD: 73.2 % (ref 44–72)
NRBC # BLD AUTO: 0 K/UL
NRBC BLD-RTO: 0 /100 WBC
PLATELET # BLD AUTO: 145 K/UL (ref 164–446)
PMV BLD AUTO: 10.2 FL (ref 9–12.9)
POTASSIUM SERPL-SCNC: 4.5 MMOL/L (ref 3.6–5.5)
PROT SERPL-MCNC: 7.3 G/DL (ref 6–8.2)
RBC # BLD AUTO: 5.09 M/UL (ref 4.7–6.1)
SODIUM SERPL-SCNC: 136 MMOL/L (ref 135–145)
WBC # BLD AUTO: 7.7 K/UL (ref 4.8–10.8)

## 2022-09-10 PROCEDURE — 83605 ASSAY OF LACTIC ACID: CPT

## 2022-09-10 PROCEDURE — 99285 EMERGENCY DEPT VISIT HI MDM: CPT

## 2022-09-10 PROCEDURE — 36415 COLL VENOUS BLD VENIPUNCTURE: CPT

## 2022-09-10 PROCEDURE — 80053 COMPREHEN METABOLIC PANEL: CPT

## 2022-09-10 PROCEDURE — A9270 NON-COVERED ITEM OR SERVICE: HCPCS | Performed by: HOSPITALIST

## 2022-09-10 PROCEDURE — 770006 HCHG ROOM/CARE - MED/SURG/GYN SEMI*

## 2022-09-10 PROCEDURE — 83036 HEMOGLOBIN GLYCOSYLATED A1C: CPT

## 2022-09-10 PROCEDURE — 85025 COMPLETE CBC W/AUTO DIFF WBC: CPT

## 2022-09-10 PROCEDURE — 700105 HCHG RX REV CODE 258: Performed by: EMERGENCY MEDICINE

## 2022-09-10 PROCEDURE — 96365 THER/PROPH/DIAG IV INF INIT: CPT

## 2022-09-10 PROCEDURE — 700111 HCHG RX REV CODE 636 W/ 250 OVERRIDE (IP): Performed by: HOSPITALIST

## 2022-09-10 PROCEDURE — 700105 HCHG RX REV CODE 258: Performed by: HOSPITALIST

## 2022-09-10 PROCEDURE — 93971 EXTREMITY STUDY: CPT | Mod: RT

## 2022-09-10 PROCEDURE — 99223 1ST HOSP IP/OBS HIGH 75: CPT | Performed by: HOSPITALIST

## 2022-09-10 PROCEDURE — 700102 HCHG RX REV CODE 250 W/ 637 OVERRIDE(OP): Performed by: HOSPITALIST

## 2022-09-10 PROCEDURE — 96375 TX/PRO/DX INJ NEW DRUG ADDON: CPT

## 2022-09-10 PROCEDURE — 87040 BLOOD CULTURE FOR BACTERIA: CPT

## 2022-09-10 PROCEDURE — 87389 HIV-1 AG W/HIV-1&-2 AB AG IA: CPT

## 2022-09-10 RX ORDER — ALBUTEROL SULFATE 90 UG/1
1-2 AEROSOL, METERED RESPIRATORY (INHALATION) EVERY 6 HOURS PRN
COMMUNITY

## 2022-09-10 RX ORDER — POLYETHYLENE GLYCOL 3350 17 G/17G
1 POWDER, FOR SOLUTION ORAL
Status: DISCONTINUED | OUTPATIENT
Start: 2022-09-10 | End: 2022-09-13 | Stop reason: HOSPADM

## 2022-09-10 RX ORDER — SODIUM CHLORIDE 9 MG/ML
1000 INJECTION, SOLUTION INTRAVENOUS ONCE
Status: COMPLETED | OUTPATIENT
Start: 2022-09-10 | End: 2022-09-10

## 2022-09-10 RX ORDER — AMOXICILLIN 250 MG
2 CAPSULE ORAL 2 TIMES DAILY
Status: DISCONTINUED | OUTPATIENT
Start: 2022-09-10 | End: 2022-09-13 | Stop reason: HOSPADM

## 2022-09-10 RX ORDER — MORPHINE SULFATE 4 MG/ML
4 INJECTION INTRAVENOUS
Status: DISCONTINUED | OUTPATIENT
Start: 2022-09-10 | End: 2022-09-13 | Stop reason: HOSPADM

## 2022-09-10 RX ORDER — ACETAMINOPHEN 500 MG
1000 TABLET ORAL EVERY 6 HOURS PRN
Status: DISCONTINUED | OUTPATIENT
Start: 2022-09-15 | End: 2022-09-13 | Stop reason: HOSPADM

## 2022-09-10 RX ORDER — OXYCODONE HYDROCHLORIDE 5 MG/1
5 TABLET ORAL
Status: DISCONTINUED | OUTPATIENT
Start: 2022-09-10 | End: 2022-09-13 | Stop reason: HOSPADM

## 2022-09-10 RX ORDER — BUPROPION HYDROCHLORIDE 150 MG/1
150 TABLET, EXTENDED RELEASE ORAL 2 TIMES DAILY
Status: DISCONTINUED | OUTPATIENT
Start: 2022-09-11 | End: 2022-09-13 | Stop reason: HOSPADM

## 2022-09-10 RX ORDER — ACETAMINOPHEN 500 MG
1000 TABLET ORAL EVERY 6 HOURS
Status: DISCONTINUED | OUTPATIENT
Start: 2022-09-10 | End: 2022-09-13 | Stop reason: HOSPADM

## 2022-09-10 RX ORDER — FLUTICASONE PROPIONATE 50 MCG
2 SPRAY, SUSPENSION (ML) NASAL EVERY MORNING
COMMUNITY

## 2022-09-10 RX ORDER — BISACODYL 10 MG
10 SUPPOSITORY, RECTAL RECTAL
Status: DISCONTINUED | OUTPATIENT
Start: 2022-09-10 | End: 2022-09-13 | Stop reason: HOSPADM

## 2022-09-10 RX ORDER — OXYCODONE HYDROCHLORIDE 10 MG/1
10 TABLET ORAL
Status: DISCONTINUED | OUTPATIENT
Start: 2022-09-10 | End: 2022-09-13 | Stop reason: HOSPADM

## 2022-09-10 RX ADMIN — AMPICILLIN AND SULBACTAM 3 G: 1; 2 INJECTION, POWDER, FOR SOLUTION INTRAMUSCULAR; INTRAVENOUS at 21:20

## 2022-09-10 RX ADMIN — RIVAROXABAN 10 MG: 10 TABLET, FILM COATED ORAL at 17:35

## 2022-09-10 RX ADMIN — AMPICILLIN AND SULBACTAM 3 G: 1; 2 INJECTION, POWDER, FOR SOLUTION INTRAMUSCULAR; INTRAVENOUS at 14:51

## 2022-09-10 RX ADMIN — SODIUM CHLORIDE 1000 ML: 9 INJECTION, SOLUTION INTRAVENOUS at 13:34

## 2022-09-10 RX ADMIN — VANCOMYCIN HYDROCHLORIDE 3 G: 500 INJECTION, POWDER, LYOPHILIZED, FOR SOLUTION INTRAVENOUS at 16:41

## 2022-09-10 ASSESSMENT — LIFESTYLE VARIABLES
TOTAL SCORE: 0
ON A TYPICAL DAY WHEN YOU DRINK ALCOHOL HOW MANY DRINKS DO YOU HAVE: 0
HAVE PEOPLE ANNOYED YOU BY CRITICIZING YOUR DRINKING: NO
EVER FELT BAD OR GUILTY ABOUT YOUR DRINKING: NO
DO YOU DRINK ALCOHOL: YES
CONSUMPTION TOTAL: NEGATIVE
TOTAL SCORE: 0
HAVE YOU EVER FELT YOU SHOULD CUT DOWN ON YOUR DRINKING: NO
HAVE YOU EVER FELT YOU SHOULD CUT DOWN ON YOUR DRINKING: NO
EVER FELT BAD OR GUILTY ABOUT YOUR DRINKING: NO
TOTAL SCORE: 0
HOW MANY TIMES IN THE PAST YEAR HAVE YOU HAD 5 OR MORE DRINKS IN A DAY: 0
HAVE PEOPLE ANNOYED YOU BY CRITICIZING YOUR DRINKING: NO
EVER HAD A DRINK FIRST THING IN THE MORNING TO STEADY YOUR NERVES TO GET RID OF A HANGOVER: NO
TOTAL SCORE: 0
CONSUMPTION TOTAL: INCOMPLETE
ALCOHOL_USE: NO
AVERAGE NUMBER OF DAYS PER WEEK YOU HAVE A DRINK CONTAINING ALCOHOL: 0
TOTAL SCORE: 0
EVER HAD A DRINK FIRST THING IN THE MORNING TO STEADY YOUR NERVES TO GET RID OF A HANGOVER: NO
TOTAL SCORE: 0

## 2022-09-10 ASSESSMENT — ENCOUNTER SYMPTOMS
EYES NEGATIVE: 1
GASTROINTESTINAL NEGATIVE: 1
CONSTITUTIONAL NEGATIVE: 1
MYALGIAS: 1
RESPIRATORY NEGATIVE: 1
CARDIOVASCULAR NEGATIVE: 1

## 2022-09-10 ASSESSMENT — PATIENT HEALTH QUESTIONNAIRE - PHQ9
SUM OF ALL RESPONSES TO PHQ9 QUESTIONS 1 AND 2: 0
2. FEELING DOWN, DEPRESSED, IRRITABLE, OR HOPELESS: NOT AT ALL
1. LITTLE INTEREST OR PLEASURE IN DOING THINGS: NOT AT ALL

## 2022-09-10 NOTE — ASSESSMENT & PLAN NOTE
Patient was tachycardic upon admit , low grade temp  and normal wbc    Hx mrsa in past    Due to the extensive nature of the infection in right leg, I expect patient will require atlest 48 hours of IV antibiotics    MRSA nares negative.  Continue on Unasyn    dvt prophylaxis    Pain control with po oxycodone and iv morphine for severe pain  Get right lower extremity x-ray  Get right lower extremity ULTRASOUND      Continue Unasyn  Follow blood culture  Wound care and LPS consulted  X-ray lower extremities unremarkable.    We will get CT with contrast for further evaluation

## 2022-09-10 NOTE — ED PROVIDER NOTES
ED Provider Note    Scribed for Kingsley Gupta M.D. by Leo Colin. 9/10/2022  11:55 AM    Primary care provider: Pcp Pt States None  Means of arrival: Walk in  History obtained from: Patient  History limited by: None    CHIEF COMPLAINT  Chief Complaint   Patient presents with    Leg Pain     L leg pain that started on Thursday with redness and swelling. Pt reports that this has happened in the past and was treated with antibiotics. Pt has hx of MRSA and staph infections. Pt denies hx of diabetes.        TAJ Fox is a 46 y.o. male who presents to the Emergency Department for evaluation of moderate right lower extremity pain onset 2 days ago. The patient states that he suddenly developed right leg pain 2 days ago without any specific trigger. He notes a history of right lower extremity issues with frequent infections and a total of 5 prior episodes. The patient reports that these episodes typically resolved with a course of antibiotics. Today her was prompted to visit the ED over complaints of increasing right lower extremity pain. Associated symptoms include fever, right lower extremity redness and right lower extremity swelling. He notes a measured maximum temperature of 99.9 °F 1 day ago which has since resolved. The patient denies any loss of sensation to the right lower extremity, chills, increased fatigue, or recent travel. He has a history of MRSA and Staphylococcus infections, but denies any prior history of diabetes, eczema, or psoriasis. No alleviating or exacerbating factors noted.     REVIEW OF SYSTEMS  Pertinent positives include right lower extremity pain, fever, right lower extremity redness and right lower extremity swelling. Pertinent negatives include no loss of sensation to the right lower extremity, chills, increased fatigue, or recent travel.  All other systems reviewed and negative.    PAST MEDICAL HISTORY   has a past medical history of Asthma.    SURGICAL HISTORY   has a past  "surgical history that includes inguinal hernia repair and other orthopedic surgery.    SOCIAL HISTORY  Social History     Tobacco Use    Smoking status: Former    Smokeless tobacco: Never   Vaping Use    Vaping Use: Never used   Substance Use Topics    Alcohol use: Yes     Comment: occ    Drug use: Never      Social History     Substance and Sexual Activity   Drug Use Never       FAMILY HISTORY  History reviewed. No pertinent family history.    CURRENT MEDICATIONS  Home Medications       Reviewed by La Burroughs (Pharmacy Tech) on 09/10/22 at 1454  Med List Status: Complete     Medication Last Dose Status   albuterol 108 (90 Base) MCG/ACT Aero Soln inhalation aerosol PRN Active   ALOE VERA PO 9/10/2022 Active   ASCORBIC ACID PO 9/10/2022 Active   buPROPion (WELLBUTRIN XL) 300 MG XL tablet 9/10/2022 Active   cyclobenzaprine (FLEXERIL) 10 mg Tab 9/8/2022 Active   fluticasone (FLONASE) 50 MCG/ACT nasal spray 9/10/2022 Active   loratadine (CLARITIN) 10 MG Tab 9/10/2022 Active   naproxen (NAPROSYN) 500 MG Tab 9/8/2022 Active                    ALLERGIES  No Known Allergies    PHYSICAL EXAM  VITAL SIGNS: /88   Pulse (!) 114   Temp 36.2 °C (97.2 °F) (Temporal)   Resp 18   Ht 1.854 m (6' 1\")   Wt 125 kg (275 lb 2.2 oz)   SpO2 97%   BMI 36.30 kg/m²     Constitutional: Well developed, Well nourished, Mild distress, Non-toxic appearance.   HENT: Normocephalic, Atraumatic, Bilateral external ears normal, Slightly dry mucous membranes, No oral exudates.   Eyes: PERRLA, EOMI, Conjunctiva normal, No discharge.   Neck: No tenderness, Supple, No stridor.   Lymphatic: No lymphadenopathy noted.   Cardiovascular: Tachycardic, Normal rhythm.   Thorax & Lungs: Clear to auscultation bilaterally, No respiratory distress, No wheezing, No crackles.   Abdomen: Soft, No tenderness, No masses, No pulsatile masses.   Skin: Warm, Dry, No erythema, No rash.   Extremities: Subacute appearing erythema throughout the anterior and " lateral aspects of the right tib-fib area, right lateral knee with streaking going up the right thigh into the groin. 2+ distal pulses No edema No cyanosis.         Musculoskeletal: No tenderness to palpation or major deformities noted.  Intact distal pulses  Neurologic: Awake, alert. Moves all extremities spontaneously.  Psychiatric: Affect normal, Judgment normal, Mood normal.     LABS  Results for orders placed or performed during the hospital encounter of 09/10/22   LACTIC ACID   Result Value Ref Range    Lactic Acid 1.5 0.5 - 2.0 mmol/L   LACTIC ACID   Result Value Ref Range    Lactic Acid 2.9 (H) 0.5 - 2.0 mmol/L   CBC WITH DIFFERENTIAL   Result Value Ref Range    WBC 7.7 4.8 - 10.8 K/uL    RBC 5.09 4.70 - 6.10 M/uL    Hemoglobin 15.8 14.0 - 18.0 g/dL    Hematocrit 46.2 42.0 - 52.0 %    MCV 90.8 81.4 - 97.8 fL    MCH 31.0 27.0 - 33.0 pg    MCHC 34.2 33.7 - 35.3 g/dL    RDW 45.2 35.9 - 50.0 fL    Platelet Count 145 (L) 164 - 446 K/uL    MPV 10.2 9.0 - 12.9 fL    Neutrophils-Polys 73.20 (H) 44.00 - 72.00 %    Lymphocytes 14.90 (L) 22.00 - 41.00 %    Monocytes 9.50 0.00 - 13.40 %    Eosinophils 1.40 0.00 - 6.90 %    Basophils 0.40 0.00 - 1.80 %    Immature Granulocytes 0.60 0.00 - 0.90 %    Nucleated RBC 0.00 /100 WBC    Neutrophils (Absolute) 5.64 1.82 - 7.42 K/uL    Lymphs (Absolute) 1.15 1.00 - 4.80 K/uL    Monos (Absolute) 0.73 0.00 - 0.85 K/uL    Eos (Absolute) 0.11 0.00 - 0.51 K/uL    Baso (Absolute) 0.03 0.00 - 0.12 K/uL    Immature Granulocytes (abs) 0.05 0.00 - 0.11 K/uL    NRBC (Absolute) 0.00 K/uL   COMP METABOLIC PANEL   Result Value Ref Range    Sodium 136 135 - 145 mmol/L    Potassium 4.5 3.6 - 5.5 mmol/L    Chloride 101 96 - 112 mmol/L    Co2 26 20 - 33 mmol/L    Anion Gap 9.0 7.0 - 16.0    Glucose 130 (H) 65 - 99 mg/dL    Bun 14 8 - 22 mg/dL    Creatinine 0.92 0.50 - 1.40 mg/dL    Calcium 9.1 8.5 - 10.5 mg/dL    AST(SGOT) 26 12 - 45 U/L    ALT(SGPT) 35 2 - 50 U/L    Alkaline Phosphatase 60 30 - 99  U/L    Total Bilirubin 0.5 0.1 - 1.5 mg/dL    Albumin 4.1 3.2 - 4.9 g/dL    Total Protein 7.3 6.0 - 8.2 g/dL    Globulin 3.2 1.9 - 3.5 g/dL    A-G Ratio 1.3 g/dL   ESTIMATED GFR   Result Value Ref Range    GFR (CKD-EPI) 103 >60 mL/min/1.73 m 2        RADIOLOGY  US-EXTREMITY VENOUS LOWER UNILAT RIGHT   Final Result        The radiologist's interpretation of all radiological studies have been reviewed by me.      COURSE & MEDICAL DECISION MAKING  Pertinent Labs & Imaging studies reviewed. (See chart for details)    11:55 AM - Patient seen and examined at bedside. Patient will be treated with NS Bolus Infusion 1000 mL. Ordered CMP, CBC with diff, Lactic Acid, Blood Culture, and US-Extremity Venous Lower Unilat Right to evaluate his symptoms. The differential diagnoses include but are not limited to: sepsis, DVT, or cellulitis. Discussed utilizing labs and imaging to further evaluate the patient, he is amenable to the plan of care.     2:16 PM - Paged Hospitalist   I discussed the patient's case and the above findings with Dr. castle (Hospitalist) who will assess the patient for hospitalization.       Decision Making:  Patient is coming in secondary to increasing redness pain and swelling of the right leg.  Images are in the computer.  Give the patient IV antibiotics, the patient was tachycardic, the patient will need to be admitted to the hospital, discussed case with Dr. Castle for hospitalization.    HYDRATION: Based on the patient's presentation of Dehydration, Sepsis, and Tachycardia the patient was given IV fluids. IV Hydration was used because oral hydration was not adequate alone. Upon recheck following hydration, the patient was improved.    DISPOSITION:  Patient will be hospitalized by Dr. Castle in guarded condition.      FINAL IMPRESSION  1. Cellulitis of right lower extremity          ILeo (Scribe), charline scribing for, and in the presence of, Kingsley Gupta M.D..    Electronically signed by: Leo  Cristi (Scribe), 9/10/2022    IKingsley M.D. personally performed the services described in this documentation, as scribed by Leo Colin in my presence, and it is both accurate and complete.    The note accurately reflects work and decisions made by me.  Kingsley Gupta M.D.  9/10/2022  3:14 PM

## 2022-09-10 NOTE — H&P
"Hospital Medicine History & Physical Note    Date of Service  9/10/2022    Primary Care Physician  Pcp Pt States None    Consultants          Code Status  Full Code    Chief Complaint  Chief Complaint   Patient presents with    Leg Pain     L leg pain that started on Thursday with redness and swelling. Pt reports that this has happened in the past and was treated with antibiotics. Pt has hx of MRSA and staph infections. Pt denies hx of diabetes.        History of Presenting Illness  Robby Fox is a 46 y.o. male who presented 9/10/2022 with pmx asthma , obesity presents with 3 days history of right  lower leg pain, swelling and redness. Patient cannot tell us how the intensity of the pain but he states the pain is \" noticeable but tolerable. This is the fifth episode of infection in right leg. He denies any recent trauma. No chills but tmax of 99.9     In ED doppler of right leg negative for dvt      Previous infections associated with MRSA.     I discussed the plan of care with patient.    Review of Systems  Review of Systems   Constitutional: Negative.    HENT: Negative.     Eyes: Negative.    Respiratory: Negative.     Cardiovascular: Negative.    Gastrointestinal: Negative.    Genitourinary: Negative.    Musculoskeletal:  Positive for myalgias.   Skin: Negative.      Past Medical History   has a past medical history of Asthma.    Surgical History   has a past surgical history that includes inguinal hernia repair and other orthopedic surgery.     Family History  No family hx of diabetes  Family history reviewed with patient. There is no family history that is pertinent to the chief complaint.     Social History   reports that he has quit smoking. He has never used smokeless tobacco. He reports current alcohol use. He reports that he does not use drugs.    He drinks one alcoholic beverage /day    Allergies  No Known Allergies    Medications  Prior to Admission Medications   Prescriptions Last Dose Informant " Patient Reported? Taking?   ascorbic acid (VITAMIN C) 1000 MG tablet  Patient Yes No   Sig: Take 1,000 mg by mouth every morning. Take with 1 tablet of vitamin C / timbo hips 500 mg for a total dose of 1,500 mg.   ascorbic acid (VITAMIN C) 500 MG tablet  Patient Yes No   Sig: Take 500 mg by mouth every morning. Take with 1 tablet of vitamin C / timbo hips 1,000 mg for a total dose of 1,500 mg.   buPROPion (WELLBUTRIN XL) 300 MG XL tablet  Patient Yes No   Sig: Take 300 mg by mouth every morning.   chlorhexidine (HIBICLENS) 4 % liquid   No No   Sig: Is from head to feet once in late December and once in late January   cyclobenzaprine (FLEXERIL) 10 mg Tab   Yes No   Sig: TAKE 1 TABLET BY MOUTH ONCE DAILY AT BEDTIME AS NEEDED   loratadine (CLARITIN) 10 MG Tab  Patient Yes No   Sig: Take 10 mg by mouth every morning.   multivitamin (THERAGRAN) Tab  Patient Yes No   Sig: Take 1 Tablet by mouth every morning.   naproxen (NAPROSYN) 500 MG Tab  Patient Yes No   Sig: Take 1,000 mg by mouth 4 times a day as needed (Pain). 2 tablets = 1,000 mg.      Facility-Administered Medications: None       Physical Exam  Temp:  [36.2 °C (97.2 °F)] 36.2 °C (97.2 °F)  Pulse:  [] 71  Resp:  [18-19] 18  BP: (110-135)/() 110/72  SpO2:  [94 %-97 %] 94 %  Blood Pressure: 110/72   Temperature: 36.2 °C (97.2 °F)   Pulse: 71   Respiration: 18   Pulse Oximetry: 94 %       Physical Exam  Constitutional:       General: He is not in acute distress.     Appearance: He is not ill-appearing or toxic-appearing.   HENT:      Mouth/Throat:      Mouth: Mucous membranes are moist.   Eyes:      General: No scleral icterus.        Left eye: No discharge.      Extraocular Movements: Extraocular movements intact.      Pupils: Pupils are equal, round, and reactive to light.   Cardiovascular:      Rate and Rhythm: Normal rate and regular rhythm.      Pulses: Normal pulses.      Heart sounds: No murmur heard.    No gallop.   Pulmonary:      Effort:  Pulmonary effort is normal. No respiratory distress.      Breath sounds: No wheezing or rales.   Abdominal:      General: Abdomen is flat. There is distension.      Palpations: There is no mass.      Tenderness: There is no abdominal tenderness. There is no guarding or rebound.      Hernia: No hernia is present.   Musculoskeletal:         General: Normal range of motion.      Cervical back: Normal range of motion.      Right lower leg: Edema present.      Comments: There is diffuse patchy redness in the right lower extremity with increased warmth   Skin:     Capillary Refill: Capillary refill takes 2 to 3 seconds.      Coloration: Skin is not jaundiced.      Findings: No bruising or lesion.   Neurological:      General: No focal deficit present.      Cranial Nerves: No cranial nerve deficit.      Motor: No weakness.      Gait: Gait normal.   Psychiatric:         Mood and Affect: Mood normal.           Laboratory:  Recent Labs     09/10/22  1323   WBC 7.7   RBC 5.09   HEMOGLOBIN 15.8   HEMATOCRIT 46.2   MCV 90.8   MCH 31.0   MCHC 34.2   RDW 45.2   PLATELETCT 145*   MPV 10.2     Recent Labs     09/10/22  1323   SODIUM 136   POTASSIUM 4.5   CHLORIDE 101   CO2 26   GLUCOSE 130*   BUN 14   CREATININE 0.92   CALCIUM 9.1     Recent Labs     09/10/22  1323   ALTSGPT 35   ASTSGOT 26   ALKPHOSPHAT 60   TBILIRUBIN 0.5   GLUCOSE 130*         No results for input(s): NTPROBNP in the last 72 hours.      No results for input(s): TROPONINT in the last 72 hours.    Imaging:  US-EXTREMITY VENOUS LOWER UNILAT RIGHT   Final Result          Us doppler right leg shows no dvt    Assessment/Plan:  Justification for Admission Status  I anticipate this patient will require at least two midnights for appropriate medical management, necessitating inpatient admission because I expect the patient will require greater than 2 midnights for broad-spectrum antibiotics to  Effect clinical control of this infection      Patient will need a Med/Surg  bed on MEDICAL service .      * Cellulitis of leg, right- (present on admission)  Assessment & Plan  Patient was tachycardic upon admit , low grade temp  and normal wbc    Hx mrsa in past    Due to the extensive nature of the infection in right leg, I expect patient will require atlest 48 hours of IV antibiotics    Patient started on iv unasyn and iv vanc    dvt prophylaxis    Pain control with po oxycodone and iv morphine for severe pain    Class 2 obesity due to excess calories without serious comorbidity in adult- (present on admission)  Assessment & Plan  Weight loss and exercise recommended    Mild intermittent asthma without complication- (present on admission)  Assessment & Plan  Not in acute exacerbation    Bronchodilators as needed          VTE prophylaxis: SCDs/TEDs

## 2022-09-10 NOTE — ED TRIAGE NOTES
"Chief Complaint   Patient presents with    Leg Pain     L leg pain that started on Thursday with redness and swelling. Pt reports that this has happened in the past and was treated with antibiotics. Pt has hx of MRSA and staph infections. Pt denies hx of diabetes.            Pt ambulated to triage for above complaint.  Pt is AO x 4, follows commands, and responds appropriately to questions. Patient's breathing is unlabored and pain is currently 9/10 on the 0-10 pain scale.  Pt placed in lobby. Patient educated on triage process and encouraged to alert staff for any changes.      BP (!) 135/100   Pulse (!) 133   Temp 36.2 °C (97.2 °F) (Temporal)   Resp 18   Ht 1.854 m (6' 1\")   Wt 125 kg (275 lb 2.2 oz)   SpO2 96%     "

## 2022-09-10 NOTE — ASSESSMENT & PLAN NOTE
Care Due:                  Date            Visit Type   Department     Provider  --------------------------------------------------------------------------------                                EP -                              PRIMARY      [facility] INTERNAL  Last Visit: 10-      CARE ([facility])   MEDICINE       Ryan Carlson                               -                              PRIMARY      [facility] INTERNAL  Next Visit: 04-      CARE ([facility])   MEDICINE       Ryan Carlson                                                            Last  Test          Frequency    Reason                     Performed    Due Date  --------------------------------------------------------------------------------    CMP.........  12 months..  rosuvastatin.............  Not Found    Overdue    HBA1C.......  6 months...  insulin..................  10-   04-    Lipid Panel.  12 months..  rosuvastatin.............  01- 01-    Powered by [employer]. Reference number: [id number].   3/28/2022 10:28:58 AM CDT   Weight loss and exercise recommended

## 2022-09-10 NOTE — ED NOTES
Pharmacy Medication Reconciliation      ~Medication reconciliation updated and complete per patient at bedside  ~Allergies have been verified  ~No oral ABX within the last 30 days  ~Patient home pharmacy: [facility]

## 2022-09-10 NOTE — ED NOTES
Pt ambulatory with cane to yellow 64 with steady gait. Changed into gown, connceted to monitors. Chart up for ERP to see.

## 2022-09-11 ENCOUNTER — APPOINTMENT (OUTPATIENT)
Dept: RADIOLOGY | Facility: MEDICAL CENTER | Age: 47
DRG: 603 | End: 2022-09-11
Attending: STUDENT IN AN ORGANIZED HEALTH CARE EDUCATION/TRAINING PROGRAM
Payer: COMMERCIAL

## 2022-09-11 PROBLEM — E87.20 LACTIC ACIDOSIS: Status: ACTIVE | Noted: 2022-09-11

## 2022-09-11 LAB
ANION GAP SERPL CALC-SCNC: 11 MMOL/L (ref 7–16)
BASOPHILS # BLD AUTO: 0.3 % (ref 0–1.8)
BASOPHILS # BLD: 0.02 K/UL (ref 0–0.12)
BUN SERPL-MCNC: 13 MG/DL (ref 8–22)
CALCIUM SERPL-MCNC: 8.2 MG/DL (ref 8.5–10.5)
CHLORIDE SERPL-SCNC: 104 MMOL/L (ref 96–112)
CO2 SERPL-SCNC: 23 MMOL/L (ref 20–33)
CREAT SERPL-MCNC: 0.77 MG/DL (ref 0.5–1.4)
CRP SERPL HS-MCNC: 9.44 MG/DL (ref 0–0.75)
EOSINOPHIL # BLD AUTO: 0.16 K/UL (ref 0–0.51)
EOSINOPHIL NFR BLD: 2.6 % (ref 0–6.9)
ERYTHROCYTE [DISTWIDTH] IN BLOOD BY AUTOMATED COUNT: 45 FL (ref 35.9–50)
ERYTHROCYTE [SEDIMENTATION RATE] IN BLOOD BY WESTERGREN METHOD: 28 MM/HOUR (ref 0–20)
EST. AVERAGE GLUCOSE BLD GHB EST-MCNC: 120 MG/DL
GFR SERPLBLD CREATININE-BSD FMLA CKD-EPI: 110 ML/MIN/1.73 M 2
GLUCOSE SERPL-MCNC: 109 MG/DL (ref 65–99)
HBA1C MFR BLD: 5.8 % (ref 4–5.6)
HCT VFR BLD AUTO: 41 % (ref 42–52)
HGB BLD-MCNC: 14.1 G/DL (ref 14–18)
IMM GRANULOCYTES # BLD AUTO: 0.03 K/UL (ref 0–0.11)
IMM GRANULOCYTES NFR BLD AUTO: 0.5 % (ref 0–0.9)
LACTATE SERPL-SCNC: 1.7 MMOL/L (ref 0.5–2)
LYMPHOCYTES # BLD AUTO: 1.32 K/UL (ref 1–4.8)
LYMPHOCYTES NFR BLD: 21.1 % (ref 22–41)
MCH RBC QN AUTO: 31.2 PG (ref 27–33)
MCHC RBC AUTO-ENTMCNC: 34.4 G/DL (ref 33.7–35.3)
MCV RBC AUTO: 90.7 FL (ref 81.4–97.8)
MONOCYTES # BLD AUTO: 0.66 K/UL (ref 0–0.85)
MONOCYTES NFR BLD AUTO: 10.6 % (ref 0–13.4)
NEUTROPHILS # BLD AUTO: 4.06 K/UL (ref 1.82–7.42)
NEUTROPHILS NFR BLD: 64.9 % (ref 44–72)
NRBC # BLD AUTO: 0 K/UL
NRBC BLD-RTO: 0 /100 WBC
PLATELET # BLD AUTO: 133 K/UL (ref 164–446)
PMV BLD AUTO: 10.1 FL (ref 9–12.9)
POTASSIUM SERPL-SCNC: 3.7 MMOL/L (ref 3.6–5.5)
RBC # BLD AUTO: 4.52 M/UL (ref 4.7–6.1)
SCCMEC + MECA PNL NOSE NAA+PROBE: NEGATIVE
SODIUM SERPL-SCNC: 138 MMOL/L (ref 135–145)
WBC # BLD AUTO: 6.3 K/UL (ref 4.8–10.8)

## 2022-09-11 PROCEDURE — 80048 BASIC METABOLIC PNL TOTAL CA: CPT

## 2022-09-11 PROCEDURE — 700105 HCHG RX REV CODE 258: Performed by: HOSPITALIST

## 2022-09-11 PROCEDURE — 85025 COMPLETE CBC W/AUTO DIFF WBC: CPT

## 2022-09-11 PROCEDURE — 85652 RBC SED RATE AUTOMATED: CPT

## 2022-09-11 PROCEDURE — 87641 MR-STAPH DNA AMP PROBE: CPT

## 2022-09-11 PROCEDURE — 86140 C-REACTIVE PROTEIN: CPT

## 2022-09-11 PROCEDURE — 700102 HCHG RX REV CODE 250 W/ 637 OVERRIDE(OP): Performed by: HOSPITALIST

## 2022-09-11 PROCEDURE — 83605 ASSAY OF LACTIC ACID: CPT

## 2022-09-11 PROCEDURE — 700111 HCHG RX REV CODE 636 W/ 250 OVERRIDE (IP): Performed by: HOSPITALIST

## 2022-09-11 PROCEDURE — 83036 HEMOGLOBIN GLYCOSYLATED A1C: CPT

## 2022-09-11 PROCEDURE — 99232 SBSQ HOSP IP/OBS MODERATE 35: CPT | Performed by: STUDENT IN AN ORGANIZED HEALTH CARE EDUCATION/TRAINING PROGRAM

## 2022-09-11 PROCEDURE — 36415 COLL VENOUS BLD VENIPUNCTURE: CPT

## 2022-09-11 PROCEDURE — 73590 X-RAY EXAM OF LOWER LEG: CPT | Mod: RT

## 2022-09-11 PROCEDURE — 770006 HCHG ROOM/CARE - MED/SURG/GYN SEMI*

## 2022-09-11 PROCEDURE — A9270 NON-COVERED ITEM OR SERVICE: HCPCS | Performed by: HOSPITALIST

## 2022-09-11 RX ADMIN — BUPROPION HYDROCHLORIDE 150 MG: 150 TABLET, EXTENDED RELEASE ORAL at 16:55

## 2022-09-11 RX ADMIN — AMPICILLIN AND SULBACTAM 3 G: 1; 2 INJECTION, POWDER, FOR SOLUTION INTRAMUSCULAR; INTRAVENOUS at 16:55

## 2022-09-11 RX ADMIN — BUPROPION HYDROCHLORIDE 150 MG: 150 TABLET, EXTENDED RELEASE ORAL at 05:08

## 2022-09-11 RX ADMIN — AMPICILLIN AND SULBACTAM 3 G: 1; 2 INJECTION, POWDER, FOR SOLUTION INTRAMUSCULAR; INTRAVENOUS at 05:08

## 2022-09-11 RX ADMIN — AMPICILLIN AND SULBACTAM 3 G: 1; 2 INJECTION, POWDER, FOR SOLUTION INTRAMUSCULAR; INTRAVENOUS at 00:07

## 2022-09-11 RX ADMIN — RIVAROXABAN 10 MG: 10 TABLET, FILM COATED ORAL at 16:55

## 2022-09-11 RX ADMIN — VANCOMYCIN HYDROCHLORIDE 1500 MG: 500 INJECTION, POWDER, LYOPHILIZED, FOR SOLUTION INTRAVENOUS at 05:45

## 2022-09-11 RX ADMIN — AMPICILLIN AND SULBACTAM 3 G: 1; 2 INJECTION, POWDER, FOR SOLUTION INTRAMUSCULAR; INTRAVENOUS at 11:29

## 2022-09-11 ASSESSMENT — ENCOUNTER SYMPTOMS
DIZZINESS: 0
MYALGIAS: 0
VOMITING: 0
NAUSEA: 0
BLURRED VISION: 0
SHORTNESS OF BREATH: 0
BRUISES/BLEEDS EASILY: 0
COUGH: 0
FEVER: 0

## 2022-09-11 NOTE — CARE PLAN
Problem: Pain - Standard  Goal: Alleviation of pain or a reduction in pain to the patient’s comfort goal  Outcome: Progressing  Note: Denies any pain     Problem: Knowledge Deficit - Standard  Goal: Patient and family/care givers will demonstrate understanding of plan of care, disease process/condition, diagnostic tests and medications  Outcome: Progressing  Note: Iv antibiotics   The patient is Stable - Low risk of patient condition declining or worsening    Shift Goals  Clinical Goals: IV antibiotics, monitor RLE infection  Patient Goals: sleep  Family Goals: ENZO    Progress made toward(s) clinical / shift goals:  iv antiobiotics    Patient is not progressing towards the following goals:

## 2022-09-11 NOTE — PROGRESS NOTES
Aaox4, denies any discomfort, RLE red and warm to touch, POC discussed, IV antibiotics as ordered, needs attended.

## 2022-09-11 NOTE — PROGRESS NOTES
"Pharmacy Vancomycin Kinetics Note for 9/10/2022     46 y.o. male on Vancomycin day # 1     Vancomycin Indication (AUC Dosing): Skin/skin structure infection  Vancomycin Indication (Two level/Trough based Dosing):      Provider specified end date: 09/14/22    Active Antibiotics (From admission, onward)      Ordered     Ordering Provider       Sat Sep 10, 2022  2:36 PM    09/10/22 1436  vancomycin (VANCOCIN) 3 g in  mL IVPB  (vancomycin (VANCOCIN) IV (LD + Maintenance))  ONCE         Luis Daniel Castle M.D.       Sat Sep 10, 2022  2:28 PM    09/10/22 1428  ampicillin/sulbactam (UNASYN) 3 g in  mL IVPB  EVERY 6 HOURS         Luis Daniel Castle M.D.    09/10/22 1428  MD Alert...Vancomycin per Pharmacy  PHARMACY TO DOSE        Question:  Indication(s) for vancomycin?  Answer:  Skin and soft tissue infection    Luis Daniel Castle M.D.            Dosing Weight: 125 kg (275 lb 9.2 oz)      Admission History: Admitted on 9/10/2022 for Cellulitis of leg, right [L03.115]    Pertinent history: 46-year-old male presenting with RLE pain with concern for cellulitis. Patient has history of 5 previous RLE infections, most recent on 8/4/22 (per chart review), which was treated with Bactrim. Patient does have a history of MRSA.    Allergies:     Patient has no known allergies.     Pertinent cultures to date:     Results       Procedure Component Value Units Date/Time    BLOOD CULTURE [[id number]] Collected: 09/10/22 1415    Order Status: Sent Specimen: Blood from Peripheral Updated: 09/10/22 1446    Narrative:      Per Hospital Policy: Only change Specimen Src: to \"Line\" if  specified by physician order.    MRSA By PCR (Amp) [[id number]]     Order Status: Sent Specimen: Respirate from Nares     BLOOD CULTURE [[id number]] Collected: 09/10/22 1323    Order Status: Sent Specimen: Blood from Peripheral Updated: 09/10/22 1331    Narrative:      Per Hospital Policy: Only change Specimen Src: to \"Line\" if  specified by physician order. " "           Labs:     Estimated Creatinine Clearance: 137.5 mL/min (by C-G formula based on SCr of 0.92 mg/dL).  Recent Labs     09/10/22  1323   WBC 7.7   NEUTSPOLYS 73.20*     Recent Labs     09/10/22  1323   BUN 14   CREATININE 0.92   ALBUMIN 4.1       Intake/Output Summary (Last 24 hours) at 9/10/2022 1708  Last data filed at 9/10/2022 1650  Gross per 24 hour   Intake 1100 ml   Output --   Net 1100 ml      /75   Pulse 98   Temp 36.2 °C (97.2 °F) (Temporal)   Resp 18   Ht 1.854 m (6' 1\")   Wt 125 kg (275 lb 2.2 oz)   SpO2 97%  Temp (24hrs), Av.2 °C (97.2 °F), Min:36.2 °C (97.2 °F), Max:36.2 °C (97.2 °F)      List concerns for Vancomycin clearance:     Obesity    Pharmacokinetics:     AUC kinetics:   Ke (hr ^-1): 0.1189 hr^-1  Half life: 5.83 hr  Clearance: 6.727  Estimated TDD: 3363.5  Estimated Dose: 1093  Estimated interval: 7.8    A/P:     -  Vancomycin dose: 1500 mg IV every 12 hours     -  Next vancomycin level(s):    -Prior to 5th dose (not yet ordered)     -  Predicted vancomycin AUC from initial AUC test calculator: 446 mg·hr/L    -  Comments: Continue to follow cultures and monitor renal function. If renal function declines, consider adjusting the vancomycin dose and/or order levels earlier.     Thank you,    Kimmy Blevins, PharmD    "

## 2022-09-11 NOTE — PROGRESS NOTES
"Hospital Medicine Daily Progress Note    Date of Service  9/11/2022    Chief Complaint  Robby Fox is a 46 y.o. male admitted 9/10/2022 with left lower leg pain.    Hospital Course  Robby Fox is a 46 y.o. male who presented 9/10/2022 with pmx asthma , obesity presents with 3 days history of right  lower leg pain, swelling and redness. Patient cannot tell us how the intensity of the pain but he states the pain is \" noticeable but tolerable. This is the fifth episode of infection in right leg. He denies any recent trauma. No chills but tmax of 99.9 .  Right leg duplex negative for DVT.  Admitted for right lower limb cellulitis       Interval Problem Update  9/11/2022  Vitals remained stable.  Remained afebrile   Labs reviewed.  Elevated ESR/CRP  Blood culture negative so far  MRSA nares negative  Get right lower extremity x-ray  Get right lower extremity ULTRASOUND      Continue Unasyn  Follow blood culture  Wound care and LPS consulted        I have discussed this patient's plan of care and discharge plan at IDT rounds today with Case Management, Nursing, Nursing leadership, and other members of the IDT team.    Consultants/Specialty  lps    Code Status  Full Code    Disposition  Patient is not medically cleared for discharge.   Anticipate discharge to to home with close outpatient follow-up.  I have placed the appropriate orders for post-discharge needs.    Review of Systems  Review of Systems   Constitutional:  Negative for fever.   HENT:  Negative for hearing loss.    Eyes:  Negative for blurred vision.   Respiratory:  Negative for cough and shortness of breath.    Cardiovascular:  Negative for chest pain.   Gastrointestinal:  Negative for nausea and vomiting.   Genitourinary:  Negative for dysuria.   Musculoskeletal:  Positive for joint pain. Negative for myalgias.   Skin:  Negative for rash.   Neurological:  Negative for dizziness.   Endo/Heme/Allergies:  Does not bruise/bleed easily.      Physical " Exam  Temp:  [36.2 °C (97.1 °F)-36.7 °C (98 °F)] 36.7 °C (98 °F)  Pulse:  [] 78  Resp:  [16-20] 20  BP: (103-139)/(67-87) 113/76  SpO2:  [95 %-100 %] 95 %    Physical Exam  Constitutional:       General: He is not in acute distress.  HENT:      Head: Normocephalic and atraumatic.      Right Ear: Tympanic membrane normal.      Left Ear: Tympanic membrane normal.      Nose: Nose normal.      Mouth/Throat:      Mouth: Mucous membranes are moist.   Eyes:      Extraocular Movements: Extraocular movements intact.      Pupils: Pupils are equal, round, and reactive to light.   Cardiovascular:      Rate and Rhythm: Normal rate and regular rhythm.      Pulses: Normal pulses.   Pulmonary:      Effort: Pulmonary effort is normal. No respiratory distress.   Abdominal:      General: Abdomen is flat. There is no distension.   Musculoskeletal:      Cervical back: Normal range of motion.      Right lower leg: No edema.      Left lower leg: No edema.      Comments: Noted significant edema in the right lower extremity extending from right knee to foot.  Distal pulse palpable.  Tender and warm to touch.   Skin:     General: Skin is warm.   Neurological:      General: No focal deficit present.      Mental Status: He is alert and oriented to person, place, and time. Mental status is at baseline.   Psychiatric:         Mood and Affect: Mood normal.       Fluids    Intake/Output Summary (Last 24 hours) at 9/11/2022 1448  Last data filed at 9/10/2022 1650  Gross per 24 hour   Intake 1100 ml   Output --   Net 1100 ml       Laboratory  Recent Labs     09/10/22  1323 09/11/22  0035   WBC 7.7 6.3   RBC 5.09 4.52*   HEMOGLOBIN 15.8 14.1   HEMATOCRIT 46.2 41.0*   MCV 90.8 90.7   MCH 31.0 31.2   MCHC 34.2 34.4   RDW 45.2 45.0   PLATELETCT 145* 133*   MPV 10.2 10.1     Recent Labs     09/10/22  1323 09/11/22  0035   SODIUM 136 138   POTASSIUM 4.5 3.7   CHLORIDE 101 104   CO2 26 23   GLUCOSE 130* 109*   BUN 14 13   CREATININE 0.92 0.77    CALCIUM 9.1 8.2*                   Imaging  US-EXTREMITY VENOUS LOWER UNILAT RIGHT   Final Result      US-EXTREMITY ARTERY LOWER UNILAT W/TALI (COMBO) RIGHT    (Results Pending)        Assessment/Plan  * Cellulitis of leg, right- (present on admission)  Assessment & Plan  Patient was tachycardic upon admit , low grade temp  and normal wbc    Hx mrsa in past    Due to the extensive nature of the infection in right leg, I expect patient will require atlest 48 hours of IV antibiotics    MRSA nares negative.  Continue on Unasyn    dvt prophylaxis    Pain control with po oxycodone and iv morphine for severe pain  Get right lower extremity x-ray  Get right lower extremity ULTRASOUND      Continue Unasyn  Follow blood culture  Wound care and LPS consulted        Class 2 obesity due to excess calories without serious comorbidity in adult- (present on admission)  Assessment & Plan  Weight loss and exercise recommended    Mild intermittent asthma without complication- (present on admission)  Assessment & Plan  Not in acute exacerbation    Bronchodilators as needed           VTE prophylaxis: SCDs/TEDs and Xarelto 10 mg daily as prophylaxis    I have performed a physical exam and reviewed and updated ROS and Plan today (9/11/2022). In review of yesterday's note (9/10/2022), there are no changes except as documented above.

## 2022-09-11 NOTE — WOUND TEAM
Wound consult placed on 9/11/22 by MD regarding red leg with clear cellulitis/infection. No open wounds noted. Wound consult completed. Recommend IV ABX.

## 2022-09-11 NOTE — CARE PLAN
The patient is Stable - Low risk of patient condition declining or worsening    Shift Goals  Clinical Goals: IV antibiotics, monitor RLE infection  Patient Goals: sleep  Family Goals: ENZO    Progress made toward(s) clinical / shift goals:  see notes for progress    Patient is not progressing towards the following goals:

## 2022-09-11 NOTE — PROGRESS NOTES
Received from ED via gurney, able to get up to BR, steady\, aaox4, denies any discomfort, RLE redness noted, POC discussed, IV antibiotics as ordered.

## 2022-09-11 NOTE — CARE PLAN
Problem: Pain - Standard  Goal: Alleviation of pain or a reduction in pain to the patient’s comfort goal  Note: Prn meds per MAR     Problem: Knowledge Deficit - Standard  Goal: Patient and family/care givers will demonstrate understanding of plan of care, disease process/condition, diagnostic tests and medications  Note: IV antibiotics   The patient is Stable - Low risk of patient condition declining or worsening         Progress made toward(s) clinical / shift goals:  iv antibiotics    Patient is not progressing towards the following goals:

## 2022-09-12 ENCOUNTER — APPOINTMENT (OUTPATIENT)
Dept: RADIOLOGY | Facility: MEDICAL CENTER | Age: 47
DRG: 603 | End: 2022-09-12
Attending: STUDENT IN AN ORGANIZED HEALTH CARE EDUCATION/TRAINING PROGRAM
Payer: COMMERCIAL

## 2022-09-12 LAB
ALBUMIN SERPL BCP-MCNC: 3.2 G/DL (ref 3.2–4.9)
ALBUMIN/GLOB SERPL: 1 G/DL
ALP SERPL-CCNC: 55 U/L (ref 30–99)
ALT SERPL-CCNC: 29 U/L (ref 2–50)
ANION GAP SERPL CALC-SCNC: 14 MMOL/L (ref 7–16)
AST SERPL-CCNC: 22 U/L (ref 12–45)
BILIRUB SERPL-MCNC: 0.3 MG/DL (ref 0.1–1.5)
BUN SERPL-MCNC: 9 MG/DL (ref 8–22)
CALCIUM SERPL-MCNC: 8.6 MG/DL (ref 8.5–10.5)
CHLORIDE SERPL-SCNC: 104 MMOL/L (ref 96–112)
CO2 SERPL-SCNC: 18 MMOL/L (ref 20–33)
CREAT SERPL-MCNC: 0.74 MG/DL (ref 0.5–1.4)
GFR SERPLBLD CREATININE-BSD FMLA CKD-EPI: 112 ML/MIN/1.73 M 2
GLOBULIN SER CALC-MCNC: 3.1 G/DL (ref 1.9–3.5)
GLUCOSE SERPL-MCNC: 101 MG/DL (ref 65–99)
POTASSIUM SERPL-SCNC: 3.8 MMOL/L (ref 3.6–5.5)
PROCALCITONIN SERPL-MCNC: 0.38 NG/ML
PROT SERPL-MCNC: 6.3 G/DL (ref 6–8.2)
SODIUM SERPL-SCNC: 136 MMOL/L (ref 135–145)

## 2022-09-12 PROCEDURE — 73701 CT LOWER EXTREMITY W/DYE: CPT | Mod: RT

## 2022-09-12 PROCEDURE — 700111 HCHG RX REV CODE 636 W/ 250 OVERRIDE (IP): Performed by: HOSPITALIST

## 2022-09-12 PROCEDURE — 85025 COMPLETE CBC W/AUTO DIFF WBC: CPT

## 2022-09-12 PROCEDURE — 770006 HCHG ROOM/CARE - MED/SURG/GYN SEMI*

## 2022-09-12 PROCEDURE — A9270 NON-COVERED ITEM OR SERVICE: HCPCS | Performed by: HOSPITALIST

## 2022-09-12 PROCEDURE — 84145 PROCALCITONIN (PCT): CPT

## 2022-09-12 PROCEDURE — 99221 1ST HOSP IP/OBS SF/LOW 40: CPT | Performed by: NURSE PRACTITIONER

## 2022-09-12 PROCEDURE — 700105 HCHG RX REV CODE 258: Performed by: HOSPITALIST

## 2022-09-12 PROCEDURE — 80053 COMPREHEN METABOLIC PANEL: CPT

## 2022-09-12 PROCEDURE — 700102 HCHG RX REV CODE 250 W/ 637 OVERRIDE(OP): Performed by: HOSPITALIST

## 2022-09-12 PROCEDURE — 700117 HCHG RX CONTRAST REV CODE 255: Performed by: STUDENT IN AN ORGANIZED HEALTH CARE EDUCATION/TRAINING PROGRAM

## 2022-09-12 PROCEDURE — 99232 SBSQ HOSP IP/OBS MODERATE 35: CPT | Performed by: STUDENT IN AN ORGANIZED HEALTH CARE EDUCATION/TRAINING PROGRAM

## 2022-09-12 RX ADMIN — AMPICILLIN AND SULBACTAM 3 G: 1; 2 INJECTION, POWDER, FOR SOLUTION INTRAMUSCULAR; INTRAVENOUS at 00:08

## 2022-09-12 RX ADMIN — SENNOSIDES AND DOCUSATE SODIUM 2 TABLET: 50; 8.6 TABLET ORAL at 18:01

## 2022-09-12 RX ADMIN — IOHEXOL 100 ML: 350 INJECTION, SOLUTION INTRAVENOUS at 17:52

## 2022-09-12 RX ADMIN — AMPICILLIN AND SULBACTAM 3 G: 1; 2 INJECTION, POWDER, FOR SOLUTION INTRAMUSCULAR; INTRAVENOUS at 12:36

## 2022-09-12 RX ADMIN — ACETAMINOPHEN 1000 MG: 500 TABLET ORAL at 12:35

## 2022-09-12 RX ADMIN — AMPICILLIN AND SULBACTAM 3 G: 1; 2 INJECTION, POWDER, FOR SOLUTION INTRAMUSCULAR; INTRAVENOUS at 18:07

## 2022-09-12 RX ADMIN — BUPROPION HYDROCHLORIDE 150 MG: 150 TABLET, EXTENDED RELEASE ORAL at 20:43

## 2022-09-12 RX ADMIN — BUPROPION HYDROCHLORIDE 150 MG: 150 TABLET, EXTENDED RELEASE ORAL at 05:24

## 2022-09-12 RX ADMIN — RIVAROXABAN 10 MG: 10 TABLET, FILM COATED ORAL at 18:01

## 2022-09-12 RX ADMIN — ACETAMINOPHEN 1000 MG: 500 TABLET ORAL at 18:01

## 2022-09-12 RX ADMIN — AMPICILLIN AND SULBACTAM 3 G: 1; 2 INJECTION, POWDER, FOR SOLUTION INTRAMUSCULAR; INTRAVENOUS at 05:21

## 2022-09-12 ASSESSMENT — PAIN DESCRIPTION - PAIN TYPE
TYPE: ACUTE PAIN
TYPE: ACUTE PAIN

## 2022-09-12 NOTE — CARE PLAN
The patient is Stable - Low risk of patient condition declining or worsening    Shift Goals  Clinical Goals: IV antibiotics, monitor for signs of infection  Patient Goals: Go home  Family Goals: N/A    Progress made toward(s) clinical / shift goals:      Problem: Pain - Standard  Goal: Alleviation of pain or a reduction in pain to the patient’s comfort goal  Outcome: Progressing     Problem: Knowledge Deficit - Standard  Goal: Patient and family/care givers will demonstrate understanding of plan of care, disease process/condition, diagnostic tests and medications  Outcome: Progressing

## 2022-09-12 NOTE — CARE PLAN
Problem: Pain - Standard  Goal: Alleviation of pain or a reduction in pain to the patient’s comfort goal  Outcome: Progressing  Note: IV antibiotics given per MAR, patient denies pain and refused Tylenol. No signs of discomfort.     Problem: Knowledge Deficit - Standard  Goal: Patient and family/care givers will demonstrate understanding of plan of care, disease process/condition, diagnostic tests and medications  Outcome: Progressing  Note: Patient understands IV antibiotic regimen during hospital stay. RN discussed antibiotic schedule with patient without questions or concerns from patient.   The patient is Stable - Low risk of patient condition declining or worsening    Shift Goals  Clinical Goals: IV antibiotics, monitor signs of infection  Patient Goals: rest  Family Goals: ENZO    Progress made toward(s) clinical / shift goals:  see notes for progress    Patient is not progressing towards the following goals:

## 2022-09-12 NOTE — PROGRESS NOTES
Pt resting comfortably in bed. No complaints of chest pain or nausea. No complaints of generalized pain. VSS and on room air. RLE red and warm to touch. Pt questions answered. Education provided on plan of action for the day and any tests done. Bed in low and locked position. Call light within reach. Will continue to monitor.

## 2022-09-12 NOTE — CONSULTS
LIMB PRESERVATION SERVICE CONSULT      REFERRED BY: Dr. Valentin    DATE OF CONSULTATION: 9/12/2022    REASON FOR CONSULT: Right lower leg     HISTORY OF PRESENT ILLNESS: Robby Fox is a 46 y.o.  with a past medical history that includes asthma, admitted 9/10/2022 for Cellulitis of leg, right [L03.115].     LPS has been consulted for right lower leg.  Patient does not have any ulcerations.  Reoccurring erythema and edema to leg.  This is his fifth occurrence in the last 2 years.  Erythema occurs in the same pattern each time with fever.  Improves with antibiotics.  He denies any injury, insect or animal bites.  Patient reports remote history of MRSA infection to left toe without ulceration.  Patient denies chills, nausea, vomiting.       IV antibiotics were started on this admission.  Infectious diseases has not consulted.  Patient reports erythema has decreased since starting antibiotics.  Xray completed and is negative for osteomyelitis. Ortho has not consulted yet. Vascular surgery not involved yet. Does not have diabetes.  Denies neuropathy.        Smoking:   reports that he has quit smoking. He has never used smokeless tobacco.    Alcohol:   reports current alcohol use.    Drug:   reports no history of drug use.      PAST MEDICAL HISTORY:   Past Medical History:   Diagnosis Date    Asthma     Sleep apnea         PAST SURGICAL HISTORY:   Past Surgical History:   Procedure Laterality Date    INGUINAL HERNIA REPAIR      OTHER ORTHOPEDIC SURGERY      cyst removal around neck       MEDICATIONS:   Current Facility-Administered Medications   Medication Dose    ampicillin/sulbactam (UNASYN) 3 g in  mL IVPB  3 g    senna-docusate (PERICOLACE or SENOKOT S) 8.6-50 MG per tablet 2 Tablet  2 Tablet    And    polyethylene glycol/lytes (MIRALAX) PACKET 1 Packet  1 Packet    And    magnesium hydroxide (MILK OF MAGNESIA) suspension 30 mL  30 mL    And    bisacodyl (DULCOLAX) suppository 10 mg  10 mg    rivaroxaban  (XARELTO) tablet 10 mg  10 mg    buPROPion SR (WELLBUTRIN-SR) tablet 150 mg  150 mg    Pharmacy Consult Request ...Pain Management Review 1 Each  1 Each    acetaminophen (TYLENOL) tablet 1,000 mg  1,000 mg    Followed by    [START ON 9/15/2022] acetaminophen (TYLENOL) tablet 1,000 mg  1,000 mg    oxyCODONE immediate-release (ROXICODONE) tablet 5 mg  5 mg    Or    oxyCODONE immediate release (ROXICODONE) tablet 10 mg  10 mg    Or    morphine 4 MG/ML injection 4 mg  4 mg       ALLERGIES:  No Known Allergies     FAMILY HISTORY: History reviewed. No pertinent family history.      REVIEW OF SYSTEMS:   Constitutional: Negative for chills, fever   Respiratory: Negative for cough and shortness of breath.    Cardiovascular:Negative for chest pain, and claudication.   Gastrointestinal: Negative for constipation, diarrhea, nausea and vomiting.   Lower extremities: positive for swelling and redness  Neurological: Negative for numbness to feet and lower legs  All other systems reviewed and are negative     RESULTS:     Recent Labs     09/10/22  1323 09/11/22  0035   WBC 7.7 6.3   RBC 5.09 4.52*   HEMOGLOBIN 15.8 14.1   HEMATOCRIT 46.2 41.0*   MCV 90.8 90.7   MCH 31.0 31.2   MCHC 34.2 34.4   RDW 45.2 45.0   PLATELETCT 145* 133*   MPV 10.2 10.1     Recent Labs     09/10/22  1323 09/11/22  0035 09/12/22  0109   SODIUM 136 138 136   POTASSIUM 4.5 3.7 3.8   CHLORIDE 101 104 104   CO2 26 23 18*   GLUCOSE 130* 109* 101*   BUN 14 13 9         ESR:     Results from last 7 days   Lab Units 09/11/22  1011   SED RATE WESTERGREN 1526 mm/hour 28*       CRP:       Results from last 7 days   Lab Units 09/11/22  1011   C REACTIVE PROTEIN 4596 mg/dL 9.44*         COVID-19: Not completed this admission     Imaging:  DX-TIBIA AND FIBULA RIGHT   Final Result      Edema without acute bony abnormality      US-EXTREMITY VENOUS LOWER UNILAT RIGHT   Final Result      US-TALI SINGLE LEVEL BILAT    (Results Pending)       Venous duplex negative  "RLE          A1c:  Lab Results   Component Value Date/Time    HBA1C 5.8 (H) 09/11/2022 10:11 AM            Microbiology:  Results       Procedure Component Value Units Date/Time    MRSA By PCR (Amp) [[id number]] Collected: 09/11/22 0914    Order Status: Completed Specimen: Respirate from Nares Updated: 09/11/22 1321     MRSA by PCR Negative    BLOOD CULTURE [[id number]] Collected: 09/10/22 1323    Order Status: Completed Specimen: Blood from Peripheral Updated: 09/11/22 0743     Significant Indicator NEG     Source BLD     Site PERIPHERAL     Culture Result No Growth  Note: Blood cultures are incubated for 5 days and  are monitored continuously.Positive blood cultures  are called to the RN and reported as soon as  they are identified.      Narrative:      Per Hospital Policy: Only change Specimen Src: to \"Line\" if  specified by physician order.  Left AC    BLOOD CULTURE [[id number]] Collected: 09/10/22 1415    Order Status: Completed Specimen: Blood from Peripheral Updated: 09/11/22 0743     Significant Indicator NEG     Source BLD     Site PERIPHERAL     Culture Result No Growth  Note: Blood cultures are incubated for 5 days and  are monitored continuously.Positive blood cultures  are called to the RN and reported as soon as  they are identified.      Narrative:      Per Hospital Policy: Only change Specimen Src: to \"Line\" if  specified by physician order.  Left Hand             PHYSICAL EXAMINATION:     VITAL SIGNS: /82   Pulse 85   Temp 36.3 °C (97.4 °F) (Temporal)   Resp 17   Ht 1.854 m (6' 1\")   Wt 125 kg (275 lb 2.2 oz)   SpO2 96%   BMI 36.30 kg/m²       General Appearance:  Well developed, well nourished, in no acute distress      Lower Extremity Assessment:    Dry flaky epidermis to BLE, RLE worse than LLE    Edema:   Moderate non pitting edema RLE  Negative for dependent rubor      ROM dorsi/plantarflexion:   Dorsiflexion intact  Left hallux valgus   Hammertoes 2 through 5 bilaterally, " flexible    Structural /mechanical changes:  Non mycotic toenails    Sensory Assessment:    Sensation intact to light touch    Pulses:  R foot: palpable DP, palpable PT  L foot: palpable DP, palpable PT                      photo:                 ASSESSMENT AND PLAN:   46 y.o. admitted for Cellulitis of leg, right [L03.115]. Presents with no ulcer to leg or foot.    -Recurrent cellulitis to RLE.  Start chlorhexidine washes daily followed by emollient with dimethicone.  -LPS to sign off.  Please reconsult if needed.  Discussed with hospitalist.        Vascular status:   -  Palpable pedal pulses bilaterally  -Recommend canceling ABIs    Antibiotics:   -currently on antibiotics managed by hospitalist   Continue short course to treat cellulitis    Weight Bearing Status:   -Right foot: Weight bearing as tolerated        Lab Results   Component Value Date/Time    HBA1C 5.8 (H) 09/11/2022 10:11 AM      Does not have diabetes.  At increased risk for diabetes  Seen by dietitian    - Implications of loss of protective sensation (LOPS) discussed with patient- including increased risk for amputation.  Advised to check feet at least daily, moisturize feet, and to always wear protective foot wear.   -avoid trimming own nails. See podiatrist or certified foot and nail RN                Discharge Plan:  Home    D/W: pt, RN, Dr. Valentin    Please note that this dictation was created using voice recognition software. I have  worked with technical experts from [employer] to optimize the interface.  I have made every reasonable attempt to correct obvious errors, but there may be errors of grammar and possibly content that I did not discover before finalizing the note.    Please contact LPS through [employer].

## 2022-09-12 NOTE — PROGRESS NOTES
"Hospital Medicine Daily Progress Note    Date of Service  9/12/2022  Chief Complaint  Robby Fox is a 46 y.o. male admitted 9/10/2022 with left lower leg pain.     Hospital Course  Robby Fox is a 46 y.o. male who presented 9/10/2022 with pmx asthma , obesity presents with 3 days history of right  lower leg pain, swelling and redness. Patient cannot tell us how the intensity of the pain but he states the pain is \" noticeable but tolerable. This is the fifth episode of infection in right leg. He denies any recent trauma. No chills but tmax of 99.9 .  Right leg duplex negative for DVT.  Admitted for right lower limb cellulitis        Interval Problem Update  9/11/2022  Vitals remained stable.  Remained afebrile   Labs reviewed.  Elevated ESR/CRP  Blood culture negative so far  MRSA nares negative  Get right lower extremity x-ray  Get right lower extremity ULTRASOUND        Continue Unasyn  Follow blood culture  Wound care and LPS consulted    9/12/2022    Vitals remained stable.  Remained afebrile  Patient remained asymptomatic.  Right lower extremity redness remain unchanged  Labs reviewed.    Blood culture remain negative so far.  Procalcitonin mildly trending down  X-ray right lower tibia-fibula unremarkable  Arterial ultrasound with TALI pending  LPS evaluation requested  His lower extremities edema and swelling likely from chronic venous status.  Encouraged for leg evaluation  Given significant redness unable to rule out underlying acute infection.  We will get CT right lower extremity with contrast.       I have discussed this patient's plan of care and discharge plan at IDT rounds today with Case Management, Nursing, Nursing leadership, and other members of the IDT team.     Consultants/Specialty  lps     Code Status  Full Code     Disposition  Patient is not medically cleared for discharge.   Anticipate discharge to to home with close outpatient follow-up.  I have placed the appropriate orders for " post-discharge needs.     Review of Systems  Review of Systems   Constitutional:  Negative for fever.   HENT:  Negative for hearing loss.    Eyes:  Negative for blurred vision.   Respiratory:  Negative for cough and shortness of breath.    Cardiovascular:  Negative for chest pain.   Gastrointestinal:  Negative for nausea and vomiting.   Genitourinary:  Negative for dysuria.   Musculoskeletal:  Positive for joint pain. Negative for myalgias.   Skin:  Negative for rash.   Neurological:  Negative for dizziness.   Endo/Heme/Allergies:  Does not bruise/bleed easily.       Physical Exam  Temp:  [36.2 °C (97.1 °F)-36.7 °C (98 °F)] 36.7 °C (98 °F)  Pulse:  [] 78  Resp:  [16-20] 20  BP: (103-139)/(67-87) 113/76  SpO2:  [95 %-100 %] 95 %     Physical Exam  Constitutional:       General: He is not in acute distress.  HENT:      Head: Normocephalic and atraumatic.      Right Ear: Tympanic membrane normal.      Left Ear: Tympanic membrane normal.      Nose: Nose normal.      Mouth/Throat:      Mouth: Mucous membranes are moist.   Eyes:      Extraocular Movements: Extraocular movements intact.      Pupils: Pupils are equal, round, and reactive to light.   Cardiovascular:      Rate and Rhythm: Normal rate and regular rhythm.      Pulses: Normal pulses.   Pulmonary:      Effort: Pulmonary effort is normal. No respiratory distress.   Abdominal:      General: Abdomen is flat. There is no distension.   Musculoskeletal:      Cervical back: Normal range of motion.      Right lower leg: No edema.      Left lower leg: No edema.      Comments: Noted significant edema in the right lower extremity extending from right knee to foot.  Distal pulse palpable.  Tender and warm to touch.   Skin:     General: Skin is warm.   Neurological:      General: No focal deficit present.      Mental Status: He is alert and oriented to person, place, and time. Mental status is at baseline.   Psychiatric:         Mood and Affect: Mood normal.        Fluids    Intake/Output Summary (Last 24 hours) at 9/12/2022 1339  Last data filed at 9/11/2022 2229  Gross per 24 hour   Intake 1360 ml   Output --   Net 1360 ml       Laboratory  Recent Labs     09/10/22  1323 09/11/22  0035   WBC 7.7 6.3   RBC 5.09 4.52*   HEMOGLOBIN 15.8 14.1   HEMATOCRIT 46.2 41.0*   MCV 90.8 90.7   MCH 31.0 31.2   MCHC 34.2 34.4   RDW 45.2 45.0   PLATELETCT 145* 133*   MPV 10.2 10.1     Recent Labs     09/10/22  1323 09/11/22  0035 09/12/22  0109   SODIUM 136 138 136   POTASSIUM 4.5 3.7 3.8   CHLORIDE 101 104 104   CO2 26 23 18*   GLUCOSE 130* 109* 101*   BUN 14 13 9   CREATININE 0.92 0.77 0.74   CALCIUM 9.1 8.2* 8.6                   Imaging  DX-TIBIA AND FIBULA RIGHT   Final Result      Edema without acute bony abnormality      US-EXTREMITY VENOUS LOWER UNILAT RIGHT   Final Result      US-TALI SINGLE LEVEL BILAT    (Results Pending)        Assessment/Plan  * Cellulitis of leg, right- (present on admission)  Assessment & Plan  Patient was tachycardic upon admit , low grade temp  and normal wbc    Hx mrsa in past    Due to the extensive nature of the infection in right leg, I expect patient will require atlest 48 hours of IV antibiotics    MRSA nares negative.  Continue on Unasyn    dvt prophylaxis    Pain control with po oxycodone and iv morphine for severe pain  Get right lower extremity x-ray  Get right lower extremity ULTRASOUND      Continue Unasyn  Follow blood culture  Wound care and LPS consulted  X-ray lower extremities unremarkable.    We will get CT with contrast for further evaluation        Lactic acidosis  Assessment & Plan  Resolved     Class 2 obesity due to excess calories without serious comorbidity in adult- (present on admission)  Assessment & Plan  Weight loss and exercise recommended    Mild intermittent asthma without complication- (present on admission)  Assessment & Plan  Not in acute exacerbation    Bronchodilators as needed           VTE prophylaxis: SCDs/TEDs  and Xarelto 10 mg daily as prophylaxis    I have performed a physical exam and reviewed and updated ROS and Plan today (9/12/2022). In review of yesterday's note (9/11/2022), there are no changes except as documented above.

## 2022-09-12 NOTE — DISCHARGE PLANNING
Met with pt. He lives in a studio type apartment , very small he said. He lives alone. He uses a cane sometimes but independent with ADLs and IADLs. He own a walker and cane. He has a sister that he visits sometimes and that he can call if he needs help.     PCP is at [facility] and uses [email or web address] [place] [facility].    Care Transition Team Assessment    Information Source  Orientation Level: Oriented X4  Information Given By: Patient  Who is responsible for making decisions for patient? : Patient    Readmission Evaluation  Is this a readmission?: No    Elopement Risk  Legal Hold: No  Ambulatory or Self Mobile in Wheelchair: Yes  Disoriented: No  Psychiatric Symptoms: None  History of Wandering: No  Elopement this Admit: No  Vocalizing Wanting to Leave: No  Displays Behaviors, Body Language Wanting to Leave: No-Not at Risk for Elopement  Elopement Risk: Not at Risk for Elopement    Interdisciplinary Discharge Planning  Does Admitting Nurse Feel This Could be a Complex Discharge?: No  Primary Care Physician: PCP is at [facility]  Lives with - Patient's Self Care Capacity: Alone and Able to Care For Self  Patient or legal guardian wants to designate a caregiver: No  Support Systems: Family Member(s) (sister)  Housing / Facility: 1 Story Apartment / Condo  Do You Take your Prescribed Medications Regularly: Yes  Able to Return to Previous ADL's: Yes  Mobility Issues: No  Prior Services: None, Home-Independent  Patient Prefers to be Discharged to:: Home  Assistance Needed: No  Durable Medical Equipment: Not Applicable    Discharge Preparedness  What is your plan after discharge?: Home with help  What are your discharge supports?: Sibling  Prior Functional Level: Ambulatory, Independent with Activities of Daily Living, Independent with Medication Management, Uses Cane  Difficulity with ADLs: Walking  Difficulity with IADLs: None    Functional Assesment  Prior Functional Level: Ambulatory, Independent with Activities of Daily Living, Independent  with Medication Management, Uses Cane    Finances  Financial Barriers to Discharge: No  Prescription Coverage: Yes    Vision / Hearing Impairment  Vision Impairment : Yes  Right Eye Vision: Impaired, Wears Glasses  Left Eye Vision: Impaired, Wears Glasses  Hearing Impairment : No    Values / Beliefs / Concerns  Values / Beliefs Concerns : No    Advance Directive  Advance Directive?: None    Domestic Abuse  Have you ever been the victim of abuse or violence?: No  Physical Abuse or Sexual Abuse: No  Verbal Abuse or Emotional Abuse: No  Possible Abuse/Neglect Reported to:: Not Applicable         Discharge Risks or Barriers  Discharge risks or barriers?: Other (comment)  Patient risk factors: Other (comment)    Anticipated Discharge Information  Discharge Disposition: Discharged to home/self care (01)

## 2022-09-13 ENCOUNTER — PHARMACY VISIT (OUTPATIENT)
Dept: PHARMACY | Facility: MEDICAL CENTER | Age: 47
End: 2022-09-13
Payer: MEDICARE

## 2022-09-13 ENCOUNTER — APPOINTMENT (OUTPATIENT)
Dept: RADIOLOGY | Facility: MEDICAL CENTER | Age: 47
DRG: 603 | End: 2022-09-13
Attending: STUDENT IN AN ORGANIZED HEALTH CARE EDUCATION/TRAINING PROGRAM
Payer: COMMERCIAL

## 2022-09-13 VITALS
TEMPERATURE: 97.5 F | OXYGEN SATURATION: 97 % | SYSTOLIC BLOOD PRESSURE: 136 MMHG | RESPIRATION RATE: 16 BRPM | WEIGHT: 275.13 LBS | DIASTOLIC BLOOD PRESSURE: 82 MMHG | BODY MASS INDEX: 36.46 KG/M2 | HEIGHT: 73 IN | HEART RATE: 75 BPM

## 2022-09-13 PROBLEM — E87.20 LACTIC ACIDOSIS: Status: RESOLVED | Noted: 2022-09-11 | Resolved: 2022-09-13

## 2022-09-13 LAB
ANION GAP SERPL CALC-SCNC: 14 MMOL/L (ref 7–16)
BASOPHILS # BLD AUTO: 1.5 % (ref 0–1.8)
BASOPHILS # BLD: 0.07 K/UL (ref 0–0.12)
BUN SERPL-MCNC: 10 MG/DL (ref 8–22)
CALCIUM SERPL-MCNC: 9 MG/DL (ref 8.5–10.5)
CHLORIDE SERPL-SCNC: 102 MMOL/L (ref 96–112)
CO2 SERPL-SCNC: 25 MMOL/L (ref 20–33)
CREAT SERPL-MCNC: 0.77 MG/DL (ref 0.5–1.4)
EOSINOPHIL # BLD AUTO: 0.22 K/UL (ref 0–0.51)
EOSINOPHIL NFR BLD: 4.7 % (ref 0–6.9)
ERYTHROCYTE [DISTWIDTH] IN BLOOD BY AUTOMATED COUNT: 49.8 FL (ref 35.9–50)
GFR SERPLBLD CREATININE-BSD FMLA CKD-EPI: 110 ML/MIN/1.73 M 2
GLUCOSE SERPL-MCNC: 90 MG/DL (ref 65–99)
HCT VFR BLD AUTO: 44.9 % (ref 42–52)
HGB BLD-MCNC: 15.1 G/DL (ref 14–18)
IMM GRANULOCYTES # BLD AUTO: 0.02 K/UL (ref 0–0.11)
IMM GRANULOCYTES NFR BLD AUTO: 0.4 % (ref 0–0.9)
LYMPHOCYTES # BLD AUTO: 1.79 K/UL (ref 1–4.8)
LYMPHOCYTES NFR BLD: 38.1 % (ref 22–41)
MCH RBC QN AUTO: 31.7 PG (ref 27–33)
MCHC RBC AUTO-ENTMCNC: 33.6 G/DL (ref 33.7–35.3)
MCV RBC AUTO: 94.3 FL (ref 81.4–97.8)
MONOCYTES # BLD AUTO: 0.49 K/UL (ref 0–0.85)
MONOCYTES NFR BLD AUTO: 10.4 % (ref 0–13.4)
NEUTROPHILS # BLD AUTO: 2.11 K/UL (ref 1.82–7.42)
NEUTROPHILS NFR BLD: 44.9 % (ref 44–72)
NRBC # BLD AUTO: 0.02 K/UL
NRBC BLD-RTO: 0.4 /100 WBC
PLATELET # BLD AUTO: 166 K/UL (ref 164–446)
PMV BLD AUTO: 11 FL (ref 9–12.9)
POTASSIUM SERPL-SCNC: 3.7 MMOL/L (ref 3.6–5.5)
PROCALCITONIN SERPL-MCNC: 0.24 NG/ML
RBC # BLD AUTO: 4.76 M/UL (ref 4.7–6.1)
SODIUM SERPL-SCNC: 141 MMOL/L (ref 135–145)
WBC # BLD AUTO: 4.7 K/UL (ref 4.8–10.8)

## 2022-09-13 PROCEDURE — 700102 HCHG RX REV CODE 250 W/ 637 OVERRIDE(OP): Performed by: HOSPITALIST

## 2022-09-13 PROCEDURE — RXMED WILLOW AMBULATORY MEDICATION CHARGE: Performed by: INTERNAL MEDICINE

## 2022-09-13 PROCEDURE — 84145 PROCALCITONIN (PCT): CPT

## 2022-09-13 PROCEDURE — 700111 HCHG RX REV CODE 636 W/ 250 OVERRIDE (IP): Performed by: HOSPITALIST

## 2022-09-13 PROCEDURE — 80048 BASIC METABOLIC PNL TOTAL CA: CPT

## 2022-09-13 PROCEDURE — 99239 HOSP IP/OBS DSCHRG MGMT >30: CPT | Performed by: INTERNAL MEDICINE

## 2022-09-13 PROCEDURE — 700105 HCHG RX REV CODE 258: Performed by: HOSPITALIST

## 2022-09-13 PROCEDURE — A9270 NON-COVERED ITEM OR SERVICE: HCPCS | Performed by: HOSPITALIST

## 2022-09-13 RX ORDER — ACETAMINOPHEN 500 MG
1000 TABLET ORAL EVERY 6 HOURS PRN
Qty: 30 TABLET | Refills: 0 | Status: SHIPPED | OUTPATIENT
Start: 2022-09-15

## 2022-09-13 RX ORDER — AMOXICILLIN AND CLAVULANATE POTASSIUM 875; 125 MG/1; MG/1
1 TABLET, FILM COATED ORAL 2 TIMES DAILY
Qty: 4 TABLET | Refills: 0 | Status: SHIPPED | OUTPATIENT
Start: 2022-09-13 | End: 2022-09-15

## 2022-09-13 RX ADMIN — ACETAMINOPHEN 1000 MG: 500 TABLET ORAL at 00:07

## 2022-09-13 RX ADMIN — AMPICILLIN AND SULBACTAM 3 G: 1; 2 INJECTION, POWDER, FOR SOLUTION INTRAMUSCULAR; INTRAVENOUS at 04:46

## 2022-09-13 RX ADMIN — ACETAMINOPHEN 1000 MG: 500 TABLET ORAL at 04:45

## 2022-09-13 RX ADMIN — AMPICILLIN AND SULBACTAM 3 G: 1; 2 INJECTION, POWDER, FOR SOLUTION INTRAMUSCULAR; INTRAVENOUS at 00:08

## 2022-09-13 ASSESSMENT — PAIN DESCRIPTION - PAIN TYPE: TYPE: ACUTE PAIN

## 2022-09-13 NOTE — CARE PLAN
The patient is Stable - Low risk of patient condition declining or worsening    Shift Goals  Clinical Goals: IV antibiotics, keep leg clean  Patient Goals: Go home  Family Goals: ENZO    Progress made toward(s) clinical / shift goals:  Patient is AAOx4. Education provided on cleaning effected leg with CHG wipes. He is on antibiotics. Education also provided on pain control and non-pharmacological ways to relieve pain. Patient demonstrates understanding of POC. Bed low, locked and call light in place.     Problem: Pain - Standard  Goal: Alleviation of pain or a reduction in pain to the patient’s comfort goal  Outcome: Progressing  No complaints of pain, will continue to reassess   Problem: Knowledge Deficit - Standard  Goal: Patient and family/care givers will demonstrate understanding of plan of care, disease process/condition, diagnostic tests and medications  Outcome: Progressing  Patient understands POC     Patient is not progressing towards the following goals: N/A

## 2022-09-13 NOTE — DOCUMENTATION QUERY
[facility]                                                                       Query Response Note      PATIENT:               JUDY RICHARDS  ACCT #:                  [id number]  MRN:                     [id number]  :                      1975  ADMIT DATE:       9/10/2022 11:15 AM  DISCH DATE:          RESPONDING  PROVIDER #:        [id number]           QUERY TEXT:    Lactic acid 2.9 is noted in the 9/10 Lab Results.  Can a diagnosis be provided to support this finding?      The patient's clinical indicators include:  9/10 Lactic acid 2.9,  Lactic acid 1.7  Risk Factors: cellulitis  Treatment: IVF, repeat lactic acid level    Thank you,  Nathan Mao RN, BSN  Clinical   Connect via [employer]  Options provided:   -- Lactic acidosis   -- Other explanation, -please specify   -- Finding of no clinical significance   -- Unable to determine      Query created by: Nathan Mao on 2022 10:39 AM    RESPONSE TEXT:    Lactic acidosis          Electronically signed by:  ANSELMO ROLAND MD 2022 7:43 PM

## 2022-09-13 NOTE — PROGRESS NOTES
Arrive to dc lounge via wheelchair. A/O, dc instructions reviewed w/ pt, verbalized understanding. Meds to bed delivered. DC home via taxi w/ voucher in stable conditon.

## 2022-09-13 NOTE — DISCHARGE PLANNING
Case Management Discharge Planning    Admission Date: 9/10/2022  GMLOS: 3.2  ALOS: 3    6-Clicks ADL Score:    6-Clicks Mobility Score:        Anticipated Discharge Dispo: Discharge Disposition: Discharged to home/self care (01)    DME Needed: No    Action(s) Taken: taxi voucher    Escalations Completed: None    Medically Clear: Yes    Next Steps: Plan for discharge home, no needs or barriers identified.     Barriers to Discharge: None

## 2022-09-13 NOTE — DISCHARGE SUMMARY
Discharge Summary    CHIEF COMPLAINT ON ADMISSION  Chief Complaint   Patient presents with    Leg Pain     L leg pain that started on Thursday with redness and swelling. Pt reports that this has happened in the past and was treated with antibiotics. Pt has hx of MRSA and staph infections. Pt denies hx of diabetes.        Reason for Admission  right leg pain/cellulitis    Admission Date  9/10/2022    CODE STATUS  Full Code    HPI & HOSPITAL COURSE    46-year-old male with history of asthma and hypertension with obesity presented 9/10 with right leg pain and swelling.  Started couple days before the admission with the swelling and redness also pain.  No significant fever however he had mild chills 99.9, patient has been admitted many times for the same reason.  On admission labs did not show any leukocytosis, procalcitonin mildly elevated 0.38, lactic acid was elevated, DVT study was negative.  And A1c 5.8.  Patient did not have sepsis, patient received IV fluid with IV antibiotics Unasyn with improving on his pain and swelling.  Patient feels better and okay for discharge, patient will go home with Augmentin to finish course of antibiotics and close monitoring with his PCP.      Therefore, he is discharged in good and stable condition to home with close outpatient follow-up.    The patient met 2-midnight criteria for an inpatient stay at the time of discharge.    Discharge Date  09/13/22      FOLLOW UP ITEMS POST DISCHARGE  Follow-up with PCP for his cellulitis  Follow-up with PCP for obesity and hypertension    DISCHARGE DIAGNOSES  Principal Problem:    Cellulitis of leg, right POA: Yes  Active Problems:    Mild intermittent asthma without complication POA: Yes    Class 2 obesity due to excess calories without serious comorbidity in adult POA: Yes  Resolved Problems:    Lactic acidosis POA: Unknown      FOLLOW UP  [facility], Emergency Dept  [address]  [address]  [phone]  Follow up  As needed    [facility] ([facility] - Primary Care and Family Medicine  [address] [place] [id number]  [phone]  Schedule an appointment as soon as possible for a visit        MEDICATIONS ON DISCHARGE     Medication List        START taking these medications        Instructions   Acetaminophen Extra Strength 500 MG Tabs  Start taking on: September 15, 2022  Generic drug: acetaminophen   Take 2 Tablets by mouth every 6 hours as needed for Fever, Moderate Pain or Mild Pain.  Dose: 1,000 mg     amoxicillin-clavulanate 875-125 MG Tabs  Commonly known as: AUGMENTIN   Take 1 Tablet by mouth 2 times a day for 2 days.  Dose: 1 Tablet            CONTINUE taking these medications        Instructions   albuterol 108 (90 Base) MCG/ACT Aers inhalation aerosol   Inhale 1-2 Puffs every 6 hours as needed for Shortness of Breath.  Dose: 1-2 Puff     ALOE VERA PO   Take 2 Capsules by mouth every morning.  Dose: 2 Capsule     ASCORBIC ACID PO   Take 2,000 mg by mouth every morning.  Dose: 2,000 mg     buPROPion 300 MG XL tablet  Commonly known as: WELLBUTRIN XL   Take 300 mg by mouth every morning.  Dose: 300 mg     cyclobenzaprine 10 mg Tabs  Commonly known as: Flexeril   TAKE 1 TABLET BY MOUTH ONCE DAILY AT BEDTIME AS NEEDED     fluticasone 50 MCG/ACT nasal spray  Commonly known as: FLONASE   Administer 2 Sprays into affected nostril(S) every morning.  Dose: 2 Spray     loratadine 10 MG Tabs  Commonly known as: CLARITIN   Take 10 mg by mouth every morning.  Dose: 10 mg     naproxen 500 MG Tabs  Commonly known as: NAPROSYN   Take 1,000 mg by mouth 4 times a day as needed (Pain). 2 tablets = 1,000 mg.  Dose: 1,000 mg              Allergies  No Known Allergies    DIET  Orders Placed This Encounter   Procedures    Diet Order Diet: Regular (no beef or beans)     Standing Status:   Standing     Number of Occurrences:   1     Order Specific Question:   Diet:     Answer:    Regular [1]     Comments:   no beef or beans       ACTIVITY  As tolerated.  Weight bearing as tolerated    CONSULTATIONS  None    PROCEDURES  None    LABORATORY  Lab Results   Component Value Date    SODIUM 141 09/13/2022    POTASSIUM 3.7 09/13/2022    CHLORIDE 102 09/13/2022    CO2 25 09/13/2022    GLUCOSE 90 09/13/2022    BUN 10 09/13/2022    CREATININE 0.77 09/13/2022        Lab Results   Component Value Date    WBC 6.3 09/11/2022    HEMOGLOBIN 14.1 09/11/2022    HEMATOCRIT 41.0 (L) 09/11/2022    PLATELETCT 133 (L) 09/11/2022        Total time of the discharge process exceeds 35 minutes.

## 2022-09-15 LAB
BACTERIA BLD CULT: NORMAL
BACTERIA BLD CULT: NORMAL
SIGNIFICANT IND 70042: NORMAL
SIGNIFICANT IND 70042: NORMAL
SITE SITE: NORMAL
SITE SITE: NORMAL
SOURCE SOURCE: NORMAL
SOURCE SOURCE: NORMAL

## 2023-04-10 ENCOUNTER — HOSPITAL ENCOUNTER (EMERGENCY)
Facility: MEDICAL CENTER | Age: 48
End: 2023-04-10
Attending: EMERGENCY MEDICINE
Payer: COMMERCIAL

## 2023-04-10 VITALS
WEIGHT: 277.56 LBS | BODY MASS INDEX: 36.79 KG/M2 | HEART RATE: 125 BPM | OXYGEN SATURATION: 98 % | SYSTOLIC BLOOD PRESSURE: 125 MMHG | TEMPERATURE: 98.2 F | HEIGHT: 73 IN | DIASTOLIC BLOOD PRESSURE: 83 MMHG | RESPIRATION RATE: 18 BRPM

## 2023-04-10 DIAGNOSIS — L05.01 PILONIDAL CYST WITH ABSCESS: ICD-10-CM

## 2023-04-10 PROCEDURE — 99283 EMERGENCY DEPT VISIT LOW MDM: CPT

## 2023-04-10 PROCEDURE — 700101 HCHG RX REV CODE 250: Performed by: EMERGENCY MEDICINE

## 2023-04-10 PROCEDURE — 700102 HCHG RX REV CODE 250 W/ 637 OVERRIDE(OP): Performed by: EMERGENCY MEDICINE

## 2023-04-10 PROCEDURE — A9270 NON-COVERED ITEM OR SERVICE: HCPCS | Performed by: EMERGENCY MEDICINE

## 2023-04-10 PROCEDURE — 303977 HCHG I & D

## 2023-04-10 RX ORDER — DOXYCYCLINE 100 MG/1
100 CAPSULE ORAL 2 TIMES DAILY
Qty: 20 CAPSULE | Refills: 0 | Status: ACTIVE | OUTPATIENT
Start: 2023-04-10 | End: 2023-04-20

## 2023-04-10 RX ORDER — DOXYCYCLINE 100 MG/1
100 TABLET ORAL ONCE
Status: COMPLETED | OUTPATIENT
Start: 2023-04-10 | End: 2023-04-10

## 2023-04-10 RX ORDER — LIDOCAINE HYDROCHLORIDE AND EPINEPHRINE BITARTRATE 20; .01 MG/ML; MG/ML
10 INJECTION, SOLUTION SUBCUTANEOUS ONCE
Status: COMPLETED | OUTPATIENT
Start: 2023-04-10 | End: 2023-04-10

## 2023-04-10 RX ORDER — HYDROCODONE BITARTRATE AND ACETAMINOPHEN 5; 325 MG/1; MG/1
1 TABLET ORAL ONCE
Status: COMPLETED | OUTPATIENT
Start: 2023-04-10 | End: 2023-04-10

## 2023-04-10 RX ADMIN — DOXYCYCLINE 100 MG: 100 TABLET, FILM COATED ORAL at 20:25

## 2023-04-10 RX ADMIN — LIDOCAINE HYDROCHLORIDE AND EPINEPHRINE 10 ML: 20; 10 INJECTION, SOLUTION INFILTRATION; PERINEURAL at 19:58

## 2023-04-10 RX ADMIN — HYDROCODONE BITARTRATE AND ACETAMINOPHEN 1 TABLET: 5; 325 TABLET ORAL at 20:25

## 2023-04-10 ASSESSMENT — LIFESTYLE VARIABLES: DO YOU DRINK ALCOHOL: NO

## 2023-04-10 ASSESSMENT — FIBROSIS 4 INDEX: FIB4 SCORE: 1.18

## 2023-04-11 NOTE — ED PROVIDER NOTES
"  ER Provider Note    Scribed for Nacho Perales M.d. by Sailaja Hurley. 4/10/2023  7:44 PM    Primary Care Provider: Pcp Pt States None    CHIEF COMPLAINT  Chief Complaint   Patient presents with    Cyst     Patient reports cyst to lower back. Patient states \"a few days ago it popped and has been leaking\"     EXTERNAL RECORDS REVIEWED  Reviewed inpatient note from September 22 where he had cellulitis of his right leg requiring admission and does have a history of MRSA    HPI/ROS  LIMITATION TO HISTORY   Select: : None  OUTSIDE HISTORIAN(S):  None     Robyb Fox is a 47 y.o. male who presents to the ED complaining of a lower back cyst onset a week ago. He states that he has had four cysts in the past, two of which have required surgery.  He has known MRSA positive.  There are no known alleviating or exacerbating factors. He denies any other pain or injury at this time. He denies any allergies.     PAST MEDICAL HISTORY  Past Medical History:   Diagnosis Date    Asthma     Sleep apnea        SURGICAL HISTORY  Past Surgical History:   Procedure Laterality Date    INGUINAL HERNIA REPAIR      OTHER ORTHOPEDIC SURGERY      cyst removal around neck       FAMILY HISTORY  No family history recorded.     SOCIAL HISTORY   reports that he has quit smoking. He has never used smokeless tobacco. He reports current alcohol use. He reports that he does not use drugs.    CURRENT MEDICATIONS  Previous Medications    ACETAMINOPHEN (TYLENOL) 500 MG TAB    Take 2 Tablets by mouth every 6 hours as needed for Fever, Moderate Pain or Mild Pain.    ALBUTEROL 108 (90 BASE) MCG/ACT AERO SOLN INHALATION AEROSOL    Inhale 1-2 Puffs every 6 hours as needed for Shortness of Breath.    ALOE VERA PO    Take 2 Capsules by mouth every morning.    ASCORBIC ACID PO    Take 2,000 mg by mouth every morning.    BUPROPION (WELLBUTRIN XL) 300 MG XL TABLET    Take 300 mg by mouth every morning.    CYCLOBENZAPRINE (FLEXERIL) 10 MG TAB    TAKE 1 " "TABLET BY MOUTH ONCE DAILY AT BEDTIME AS NEEDED    FLUTICASONE (FLONASE) 50 MCG/ACT NASAL SPRAY    Administer 2 Sprays into affected nostril(S) every morning.    LORATADINE (CLARITIN) 10 MG TAB    Take 10 mg by mouth every morning.    NAPROXEN (NAPROSYN) 500 MG TAB    Take 1,000 mg by mouth 4 times a day as needed (Pain). 2 tablets = 1,000 mg.       ALLERGIES  Patient has no known allergies.    PHYSICAL EXAM  BP (!) 143/86   Pulse (!) 138   Temp 36.3 °C (97.3 °F) (Temporal)   Resp 18   Ht 1.854 m (6' 1\")   Wt (!) 126 kg (277 lb 9 oz)   SpO2 100%   BMI 36.62 kg/m²     Constitutional: Well developed, Well nourished, No acute distress, Non-toxic appearance.   HENT: Normocephalic, Atraumatic, Bilateral external ears normal, Oropharynx is clear mucous membranes are moist. No oral exudates or nasal discharge.   Eyes: Pupils are equal round and reactive, EOMI, Conjunctiva normal, No discharge.   Neck: Normal range of motion, No tenderness, Supple, No stridor. No meningismus.  Lymphatic: No lymphadenopathy noted.   Cardiovascular: Regular rate and rhythm without murmur rub or gallop.  Thorax & Lungs: Clear breath sounds bilaterally without wheezes, rhonchi or rales. There is no chest wall tenderness.   Abdomen: Soft non-tender non-distended. There is no rebound or guarding. No organomegaly is appreciated. Bowel sounds are normal.  Skin: Cyst on right lower lumbar region, Area of erythema about 4 x  8 cm, Abscess centrally, central fluctuance, Some spontaneous drainage   Back: No CVA or spinal tenderness.   Extremities: Intact distal pulses, No edema, No tenderness, No cyanosis, No clubbing. Capillary refill is less than 2 seconds.  Musculoskeletal: Good range of motion in all major joints. No tenderness to palpation or major deformities noted.   Neurologic: Alert & oriented x 3, Normal motor function, Normal sensory function, No focal deficits noted. Reflexes are normal.  Psychiatric: Affect normal, Judgment normal, " Mood normal. There is no suicidal ideation or patient reported hallucinations.     Incision and Drainage Procedure Note    Indication: Abscess    Procedure: The patient was positioned appropriately and the skin over the incision site was prepped with betadine and draped in a sterile fashion. Local anesthesia was obtained by infiltration using 10 ccs 1% Lidocaine with epinephrine.  An incision was then made over the apex of the lesion and approximately 7 cc of foul smelling material was expressed. Loculations were broken up using forceps and more of the material was able to be expressed. The drainage cavity was then packed with sterile gauze. The patient’s tetanus status was up to date and did not require a booster dose.    The patient tolerated the procedure well.    Complications: None      COURSE & MEDICAL DECISION MAKING     ED Observation Status? No    INITIAL ASSESSMENT, COURSE AND PLAN  Care Narrative: Patient was noted to have recurrent pilonidal cyst and he does have another one today requiring incision and drainage which was performed as above.  Patient will need MRSA coverage as well in the coming days and follow-up with surgery for possible marsupialization    7:49 PM - Patient seen and examined at bedside. Discussed plan of care, including an incision and drainage procedure. Patient agrees to the plan of care. The patient will be medicated with lidocaine with epi prior to drainage.       DISPOSITION AND DISCUSSIONS    Decision tools and prescription drugs considered including, but not limited to: Pain Medications patient will use OTC medications for pain control .    The patient will return for new or worsening symptoms and is stable at the time of discharge.    The patient is referred to a primary physician for blood pressure management, diabetic screening, and for all other preventative health concerns.    DISPOSITION:  Patient will be discharged home in stable condition.    FOLLOW UP:  No follow-up  provider specified.    OUTPATIENT MEDICATIONS:  New Prescriptions    No medications on file        FINAL DIANGOSIS  1. Pilonidal cyst with abscess      2. Incision and drainage of abscess by ERP, complex.       ISailaja (Scribe), am scribing for, and in the presence of, Nacho Perales M.D..    Electronically signed by: Sailaja Hurley (Scribe), 4/10/2023    Nacho WANG M.D. personally performed the services described in this documentation, as scribed by Sailaja Hurley in my presence, and it is both accurate and complete.      The note accurately reflects work and decisions made by me.  Nacho Perales M.D.  4/10/2023  9:11 PM

## 2023-04-11 NOTE — DISCHARGE INSTRUCTIONS
Follow-up with Dr. Pillai as you may need marsupialization of this pilonidal cyst    Take doxycycline for 10 days as prescribed

## 2023-04-11 NOTE — ED TRIAGE NOTES
"Chief Complaint   Patient presents with    Cyst     Patient reports cyst to lower back. Patient states \"a few days ago it popped and has been leaking\"       48 yo male to triage for above complaint.     Pt is alert and oriented, speaking in full sentences, follows commands and responds appropriately to questions.     Patient placed back in lobby and educated on triage process. Asked to inform RN of any changes.    BP (!) 143/86   Pulse (!) 138   Temp 36.3 °C (97.3 °F) (Temporal)   Resp 18   Ht 1.854 m (6' 1\")   Wt (!) 126 kg (277 lb 9 oz)   SpO2 100%   BMI 36.62 kg/m²     "

## 2024-04-09 ENCOUNTER — APPOINTMENT (OUTPATIENT)
Dept: RADIOLOGY | Facility: MEDICAL CENTER | Age: 49
End: 2024-04-09
Attending: EMERGENCY MEDICINE
Payer: COMMERCIAL

## 2024-04-09 ENCOUNTER — HOSPITAL ENCOUNTER (EMERGENCY)
Facility: MEDICAL CENTER | Age: 49
End: 2024-04-09
Attending: EMERGENCY MEDICINE
Payer: COMMERCIAL

## 2024-04-09 VITALS
SYSTOLIC BLOOD PRESSURE: 116 MMHG | HEIGHT: 73 IN | WEIGHT: 287.48 LBS | DIASTOLIC BLOOD PRESSURE: 82 MMHG | HEART RATE: 102 BPM | OXYGEN SATURATION: 98 % | BODY MASS INDEX: 38.1 KG/M2 | RESPIRATION RATE: 18 BRPM | TEMPERATURE: 97.3 F

## 2024-04-09 DIAGNOSIS — J06.9 UPPER RESPIRATORY TRACT INFECTION, UNSPECIFIED TYPE: ICD-10-CM

## 2024-04-09 LAB
EKG IMPRESSION: NORMAL
FLUAV RNA SPEC QL NAA+PROBE: NEGATIVE
FLUBV RNA SPEC QL NAA+PROBE: NEGATIVE
RSV RNA SPEC QL NAA+PROBE: NEGATIVE
SARS-COV-2 RNA RESP QL NAA+PROBE: NOTDETECTED

## 2024-04-09 PROCEDURE — 93005 ELECTROCARDIOGRAM TRACING: CPT | Performed by: EMERGENCY MEDICINE

## 2024-04-09 PROCEDURE — 99284 EMERGENCY DEPT VISIT MOD MDM: CPT

## 2024-04-09 PROCEDURE — 93005 ELECTROCARDIOGRAM TRACING: CPT

## 2024-04-09 PROCEDURE — 71045 X-RAY EXAM CHEST 1 VIEW: CPT

## 2024-04-09 PROCEDURE — 0241U HCHG SARS-COV-2 COVID-19 NFCT DS RESP RNA 4 TRGT ED POC: CPT

## 2024-04-09 RX ORDER — ALBUTEROL SULFATE 90 UG/1
2 AEROSOL, METERED RESPIRATORY (INHALATION) EVERY 6 HOURS PRN
Qty: 8.5 G | Refills: 0 | Status: SHIPPED | OUTPATIENT
Start: 2024-04-09

## 2024-04-09 ASSESSMENT — FIBROSIS 4 INDEX: FIB4 SCORE: 1.21

## 2024-04-09 NOTE — ED TRIAGE NOTES
"Chief Complaint   Patient presents with    Cough    Congestion    Shortness of Breath     Pt. Ambulated into triage for above complaints.  States all started 2 weeks ago.  Denies any pain.  Reports that he has a long history of bronchitis \"flare up's\" since 1987 and this feels the same.      "

## 2024-04-10 NOTE — ED NOTES
"Pt discharged home via self. Patient in possession of belongings. Pt provided discharge education and information pertaining to medications and follow up appointments. Pt received copy of discharge instructions and verbalized understanding. Discussed signs and symptoms to return to the ER, patient verbalized understanding. Pt is A/O x 4, ambulatory with a steady gait to the lobby.      /82   Pulse (!) 102   Temp 36.3 °C (97.3 °F) (Temporal)   Resp 18   Ht 1.854 m (6' 1\")   Wt (!) 130 kg (287 lb 7.7 oz)   SpO2 98%   BMI 37.93 kg/m²    "

## 2024-04-10 NOTE — ED PROVIDER NOTES
"ER Provider Note      Primary Care Provider: Pcp Pt States None    CHIEF COMPLAINT   Chief Complaint   Patient presents with    Cough    Congestion    Shortness of Breath       EXTERNAL RECORDS REVIEWED  Outpatient Notes neurology, [place] outpatient none    HPI/ROS  LIMITATION TO HISTORY   None  OUTSIDE HISTORIAN(S):  None    Robby Fox is a 48 y.o. male who presents to the ED complaining of cough and congestion.  Patient states that he has had the symptoms \"since 1987\" and states he always gets bronchitis.  He has no chest pain, shortness, or vomiting.  No fever or chills.  Patient states that usually he will have some antibiotics, but none recently.    PAST MEDICAL HISTORY  Past Medical History:   Diagnosis Date    Asthma     Bronchitis     Sleep apnea        SURGICAL HISTORY  Past Surgical History:   Procedure Laterality Date    INGUINAL HERNIA REPAIR      OTHER ORTHOPEDIC SURGERY      cyst removal around neck       FAMILY HISTORY  History reviewed. No pertinent family history.    SOCIAL HISTORY   reports that he has quit smoking. He has never used smokeless tobacco. He reports that he does not currently use alcohol. He reports that he does not use drugs.    CURRENT MEDICATIONS  Previous Medications    ACETAMINOPHEN (TYLENOL) 500 MG TAB    Take 2 Tablets by mouth every 6 hours as needed for Fever, Moderate Pain or Mild Pain.    ALBUTEROL 108 (90 BASE) MCG/ACT AERO SOLN INHALATION AEROSOL    Inhale 1-2 Puffs every 6 hours as needed for Shortness of Breath.    ALOE VERA PO    Take 2 Capsules by mouth every morning.    ASCORBIC ACID PO    Take 2,000 mg by mouth every morning.    BUPROPION (WELLBUTRIN XL) 300 MG XL TABLET    Take 300 mg by mouth every morning.    CYCLOBENZAPRINE (FLEXERIL) 10 MG TAB    TAKE 1 TABLET BY MOUTH ONCE DAILY AT BEDTIME AS NEEDED    FLUTICASONE (FLONASE) 50 MCG/ACT NASAL SPRAY    Administer 2 Sprays into affected nostril(S) every morning.    LORATADINE (CLARITIN) 10 MG " "TAB    Take 10 mg by mouth every morning.    NAPROXEN (NAPROSYN) 500 MG TAB    Take 1,000 mg by mouth 4 times a day as needed (Pain). 2 tablets = 1,000 mg.       ALLERGIES  No Known Allergies    PHYSICAL EXAM  /82   Pulse (!) 102   Temp 36.3 °C (97.3 °F) (Temporal)   Resp 18   Ht 1.854 m (6' 1\")   Wt (!) 130 kg (287 lb 7.7 oz)   SpO2 98%   BMI 37.93 kg/m²   Constitutional: Well developed, well nourished. No acute distress.  HEENT: Normocephalic, atraumatic. Posterior pharynx clear and moist.  Eyes:  EOMI. Normal sclera.  Neck: Supple, Full range of motion, nontender.  Chest/Pulmonary: clear to ausculation. Symmetrical expansion.   Cardio: Regular rate and rhythm with no murmur  Musculoskeletal: No deformity, no edema, neurovascular intact.   Neuro: Clear speech, appropriate, cooperative, cranial nerves II-XII grossly intact.  Psych: Normal mood and affect     DIAGNOSTIC STUDIES    EKG/LABS  Results for orders placed or performed during the hospital encounter of 24   EKG   Result Value Ref Range    Report       [facility] Emergency Dept.    Test Date:  2024  Pt Name:    JUDY RICHARDS               Department: ER  MRN:        [id number]                      Room:  Gender:     Male                         Technician: [id number]  :        1975                   Requested By:ER TRIAGE PROTOCOL  Order #:    [id number]                    Reading MD:    Measurements  Intervals                                Axis  Rate:       105                          P:          49  LA:         140                          QRS:        6  QRSD:       102                          T:          56  QT:         344  QTc:        455    Interpretive Statements  Sinus tachycardia  No previous ECG available for comparison     POC CoV-2, FLU A/B, RSV by PCR   Result Value Ref Range    POC Influenza A RNA, PCR Negative Negative    POC Influenza B RNA, PCR Negative Negative    POC RSV, by PCR Negative " Negative    POC SARS-CoV-2, PCR NotDetected        I have independently interpreted this EKG    EKG; sinus tach at 105.  No ST elevation, ST depression.  QTc is 455.  No previous EKG for comparison.    RADIOLOGY  The attending emergency physician has independently interpreted the diagnostic imaging associated with this visit and am waiting the final reading from the radiologist.   My preliminary interpretation is a follows: See below    Radiologist interpretation:  DX-CHEST-PORTABLE (1 VIEW)   Final Result      No acute cardiac or pulmonary abnormalities are identified.             COURSE & MEDICAL DECISION MAKING     ASSESSMENT, COURSE AND PLAN  Care Narrative: This is a 48-year-old male here for evaluation of upper respiratory symptoms.  Patient will be given albuterol inhaler, and will follow-up as outpatient.  He has clear x-ray, he has no shortness of breath, and no chest pain.    DISPOSITION AND DISCUSSIONS  I have discussed management of the patient with the following physicians and BRETT's: None    Discussion of management with other QHP or appropriate source(s): None    Escalation of care considered, and ultimately not performed: IV fluids not indicated.    Barriers to care at this time, including but not limited to: Transportation.     Decision tools and prescription drugs considered including, but not limited to: Albuterol.    FINAL DIANGOSIS  1. Upper respiratory tract infection, unspecified type

## 2024-04-10 NOTE — ED NOTES
Bedside report received from GENIE Chaudhry assumed care of patient.  POC discussed with patient. Call light within reach, all needs addressed at this time.   Fall risk interventions in place: Not Applicable (all applicable per [place] Fall risk assessment)   Continuous monitoring: Not Applicable   IVF/IV medications: Not Applicable   Oxygen: Room Air  Bedside sitter: Not Applicable   Isolation: Not Applicable

## 2025-07-29 NOTE — DIETARY
Nutrition Services: Diabetes Education Consult   Day 1 of admit.  Robby Fox is a 46 y.o. male with admitting DX of Cellulitis of leg, right [L03.115]    RD able to visit pt at bedside to provide pre-diabetes diet education. RD discussed consistent CHO, provided handout reinforcing topics discussed. Pt demonstrated adequate readiness and appropriate evidence of learning. RD able to answer all questions to patient's satisfaction.     No other education needs identified at this time. Consider referral to outpatient nutrition services for continuation of education as indicated or per pt preferences.     Please re-consult RD as indicated.    
Instructions: This plan will send the code FBSE to the PM system.  DO NOT or CHANGE the price.
Price (Do Not Change): 0.00
Detail Level: Simple